# Patient Record
Sex: FEMALE | URBAN - METROPOLITAN AREA
[De-identification: names, ages, dates, MRNs, and addresses within clinical notes are randomized per-mention and may not be internally consistent; named-entity substitution may affect disease eponyms.]

---

## 2023-03-07 LAB
ANION GAP IN SER/PLAS: 14 MMOL/L (ref 10–20)
CALCIUM (MG/DL) IN SER/PLAS: 8.4 MG/DL (ref 8.6–10.3)
CARBON DIOXIDE, TOTAL (MMOL/L) IN SER/PLAS: 22 MMOL/L (ref 21–32)
CHLORIDE (MMOL/L) IN SER/PLAS: 106 MMOL/L (ref 98–107)
CREATININE (MG/DL) IN SER/PLAS: 1.23 MG/DL (ref 0.5–1.05)
ERYTHROCYTE DISTRIBUTION WIDTH (RATIO) BY AUTOMATED COUNT: 15.1 % (ref 11.5–14.5)
ERYTHROCYTE MEAN CORPUSCULAR HEMOGLOBIN CONCENTRATION (G/DL) BY AUTOMATED: 32 G/DL (ref 32–36)
ERYTHROCYTE MEAN CORPUSCULAR VOLUME (FL) BY AUTOMATED COUNT: 92 FL (ref 80–100)
ERYTHROCYTES (10*6/UL) IN BLOOD BY AUTOMATED COUNT: 5.06 X10E12/L (ref 4–5.2)
GFR FEMALE: 44 ML/MIN/1.73M2
GLUCOSE (MG/DL) IN SER/PLAS: 84 MG/DL (ref 74–99)
HEMATOCRIT (%) IN BLOOD BY AUTOMATED COUNT: 46.6 % (ref 36–46)
HEMOGLOBIN (G/DL) IN BLOOD: 14.9 G/DL (ref 12–16)
LEUKOCYTES (10*3/UL) IN BLOOD BY AUTOMATED COUNT: 13.7 X10E9/L (ref 4.4–11.3)
NRBC (PER 100 WBCS) BY AUTOMATED COUNT: 0 /100 WBC (ref 0–0)
PLATELETS (10*3/UL) IN BLOOD AUTOMATED COUNT: 290 X10E9/L (ref 150–450)
POTASSIUM (MMOL/L) IN SER/PLAS: 4.3 MMOL/L (ref 3.5–5.3)
SODIUM (MMOL/L) IN SER/PLAS: 138 MMOL/L (ref 136–145)
UREA NITROGEN (MG/DL) IN SER/PLAS: 27 MG/DL (ref 6–23)

## 2023-06-23 ENCOUNTER — NURSING HOME VISIT (OUTPATIENT)
Dept: POST ACUTE CARE | Facility: EXTERNAL LOCATION | Age: 83
End: 2023-06-23
Payer: COMMERCIAL

## 2023-06-23 DIAGNOSIS — I63.9 CEREBROVASCULAR ACCIDENT (CVA), UNSPECIFIED MECHANISM (MULTI): ICD-10-CM

## 2023-06-23 DIAGNOSIS — I10 PRIMARY HYPERTENSION: ICD-10-CM

## 2023-06-23 DIAGNOSIS — I25.10 CORONARY ARTERY DISEASE INVOLVING NATIVE CORONARY ARTERY OF NATIVE HEART WITHOUT ANGINA PECTORIS: Primary | ICD-10-CM

## 2023-06-23 DIAGNOSIS — I48.0 PAROXYSMAL ATRIAL FIBRILLATION (MULTI): ICD-10-CM

## 2023-06-23 PROCEDURE — 99305 1ST NF CARE MODERATE MDM 35: CPT | Performed by: INTERNAL MEDICINE

## 2023-06-23 NOTE — LETTER
Patient: Erinn Eagle  : 1940    Encounter Date: 2023    HISTORY & PHYSICAL    Subjective  Chief complaint: Erinn Eagle is a 83 y.o. female who is a acute skilled care patient being seen and evaluated for multiple medical problems.  Patient presents for weakness.    HPI:  Patient was admitted to the hospital for back pain. Patient was started on vanco and Zosyn for HTN. Patient's back pain was evaluated by PT and OT. Pt was stable and discharged to a SNF for back pain.         Past Medical History:   Diagnosis Date   • A-fib (CMS/[facility])    • CAD (coronary artery disease)    • Chronic back pain    • CVA (cerebral vascular accident) (CMS/[facility])    • Hypertension, essential        No past surgical history on file.    Family History   Problem Relation Name Age of Onset   • No Known Problems Mother     • No Known Problems Father         Social History     Socioeconomic History   • Marital status: Not on file     Spouse name: Not on file   • Number of children: Not on file   • Years of education: Not on file   • Highest education level: Not on file   Occupational History   • Not on file   Tobacco Use   • Smoking status: Not on file   • Smokeless tobacco: Not on file   Substance and Sexual Activity   • Alcohol use: Not on file   • Drug use: Not on file   • Sexual activity: Not on file   Other Topics Concern   • Not on file   Social History Narrative   • Not on file     Social Determinants of Health     Financial Resource Strain: Not on file   Food Insecurity: Not on file   Transportation Needs: Not on file   Physical Activity: Not on file   Stress: Not on file   Social Connections: Not on file   Intimate Partner Violence: Not on file   Housing Stability: Not on file       Vital signs: 128/68, 97.9, 86, 18, 99%    Objective  Physical Exam  HENT:      Head: Normocephalic and atraumatic.      Nose: Nose normal.      Mouth/Throat:      Mouth: Mucous membranes are moist.      Pharynx: Oropharynx is clear.    Eyes:      Extraocular Movements: Extraocular movements intact.      Pupils: Pupils are equal, round, and reactive to light.   Cardiovascular:      Rate and Rhythm: Normal rate and regular rhythm.   Pulmonary:      Effort: No respiratory distress.      Breath sounds: Normal breath sounds. No wheezing, rhonchi or rales.   Abdominal:      General: Bowel sounds are normal. There is no distension.      Palpations: Abdomen is soft.      Tenderness: There is no abdominal tenderness. There is no guarding.   Musculoskeletal:      Right lower leg: No edema.      Left lower leg: No edema.   Skin:     General: Skin is warm and dry.   Neurological:      Mental Status: She is alert. Mental status is at baseline.         Assessment/Plan  Problem List Items Addressed This Visit       Paroxysmal atrial fibrillation (CMS/HCC)    Coronary artery disease involving native coronary artery - Primary    CVA (cerebral vascular accident) (CMS/[facility])    Primary hypertension     Medications, treatments, and labs reviewed  Continue medications and treatments as listed in [facility]    Scribe Attestation  Opal WEISS Scribe   attest that this documentation has been prepared under the direction and in the presence of Brandan Yan DO.    An interactive audio and/or video telecommunication system which permits real time communications between the patient (at the originating site) and provider (at a distant site) was utilized to provide this telehealth service after obtaining verbal consent.    Provider Attestation - Scribe documentation  All medical record entries made by the Scribe were at my direction and personally dictated by me. I have reviewed the chart and agree that the record accurately reflects my personal performance of the history, physical exam, discussion and plan.    Brandan Yan DO          Electronically Signed By: Brandan Yan DO   6/29/23  9:48 PM

## 2023-06-26 ENCOUNTER — NURSING HOME VISIT (OUTPATIENT)
Dept: POST ACUTE CARE | Facility: EXTERNAL LOCATION | Age: 83
End: 2023-06-26
Payer: COMMERCIAL

## 2023-06-26 DIAGNOSIS — I48.0 PAROXYSMAL ATRIAL FIBRILLATION (MULTI): ICD-10-CM

## 2023-06-26 DIAGNOSIS — I63.9 CEREBROVASCULAR ACCIDENT (CVA), UNSPECIFIED MECHANISM (MULTI): ICD-10-CM

## 2023-06-26 DIAGNOSIS — F03.90 DEMENTIA, UNSPECIFIED DEMENTIA SEVERITY, UNSPECIFIED DEMENTIA TYPE, UNSPECIFIED WHETHER BEHAVIORAL, PSYCHOTIC, OR MOOD DISTURBANCE OR ANXIETY (MULTI): ICD-10-CM

## 2023-06-26 DIAGNOSIS — I25.10 CORONARY ARTERY DISEASE INVOLVING NATIVE CORONARY ARTERY OF NATIVE HEART WITHOUT ANGINA PECTORIS: Primary | ICD-10-CM

## 2023-06-26 DIAGNOSIS — K21.9 GASTROESOPHAGEAL REFLUX DISEASE WITHOUT ESOPHAGITIS: ICD-10-CM

## 2023-06-26 DIAGNOSIS — I10 PRIMARY HYPERTENSION: ICD-10-CM

## 2023-06-26 PROCEDURE — 99309 SBSQ NF CARE MODERATE MDM 30: CPT | Performed by: INTERNAL MEDICINE

## 2023-06-26 NOTE — LETTER
Patient: Erinn Eagle  : 1940    Encounter Date: 2023    PROGRESS NOTE    Subjective  Chief complaint: Erinn Eagle is a 83 y.o. female who is a acute skilled care patient being seen and evaluated for monthly general medical care and follow-up.    HPI:  HPI   Patient presents for general medical care and f/u.  Patient seen and examined at bedside.  Patient has been working in therapy due to weakness after recent hospitalization and back pain.  Patient has no acute complaints.  Denies n/v/f/c and chest pain.  No new concerns reported by nursing.    Objective  Vital signs: 128/68, 99% O2 via nc  Physical Exam  Constitutional:       General: She is not in acute distress.  Eyes:      Extraocular Movements: Extraocular movements intact.   Pulmonary:      Effort: Pulmonary effort is normal.   Genitourinary:     Comments: Snell  Musculoskeletal:      Cervical back: Neck supple.   Neurological:      Mental Status: She is alert.   Psychiatric:         Mood and Affect: Mood normal.         Behavior: Behavior is cooperative.         Assessment/Plan  Problem List Items Addressed This Visit       Paroxysmal atrial fibrillation (CMS/HCC)    Coronary artery disease involving native coronary artery - Primary    CVA (cerebral vascular accident) (CMS/HCC)    Primary hypertension    GERD (gastroesophageal reflux disease)    Dementia (CMS/HCC)     Medications, treatments, and labs reviewed  Continue medications and treatments as listed in EMR    Scribe Attestation  I, Tessy Albarado   attest that this documentation has been prepared under the direction and in the presence of Brandan Yan DO    Provider Attestation - Scribe documentation  All medical record entries made by the Scribe were at my direction and personally dictated by me. I have reviewed the chart and agree that the record accurately reflects my personal performance of the history, physical exam, discussion and plan.   Brandan CHACON  DO Yuriy        Electronically Signed By: Brandan Yan DO   9/27/23 11:06 PM

## 2023-06-26 NOTE — PROGRESS NOTES
HISTORY & PHYSICAL    Subjective   Chief complaint: Erinn Eagle is a 83 y.o. female who is a acute skilled care patient being seen and evaluated for multiple medical problems.  Patient presents for weakness.    HPI:  Patient was admitted to the hospital for back pain. Patient was started on vanco and Zosyn for HTN. Patient's back pain was evaluated by PT and OT. Pt was stable and discharged to a SNF for back pain.         Past Medical History:   Diagnosis Date    A-fib (CMS/[facility])     CAD (coronary artery disease)     Chronic back pain     CVA (cerebral vascular accident) (CMS/[facility])     Hypertension, essential        No past surgical history on file.    Family History   Problem Relation Name Age of Onset    No Known Problems Mother      No Known Problems Father         Social History     Socioeconomic History    Marital status: Not on file     Spouse name: Not on file    Number of children: Not on file    Years of education: Not on file    Highest education level: Not on file   Occupational History    Not on file   Tobacco Use    Smoking status: Not on file    Smokeless tobacco: Not on file   Substance and Sexual Activity    Alcohol use: Not on file    Drug use: Not on file    Sexual activity: Not on file   Other Topics Concern    Not on file   Social History Narrative    Not on file     Social Determinants of Health     Financial Resource Strain: Not on file   Food Insecurity: Not on file   Transportation Needs: Not on file   Physical Activity: Not on file   Stress: Not on file   Social Connections: Not on file   Intimate Partner Violence: Not on file   Housing Stability: Not on file       Vital signs: 128/68, 97.9, 86, 18, 99%    Objective   Physical Exam  HENT:      Head: Normocephalic and atraumatic.      Nose: Nose normal.      Mouth/Throat:      Mouth: Mucous membranes are moist.      Pharynx: Oropharynx is clear.   Eyes:      Extraocular Movements: Extraocular movements intact.      Pupils: Pupils are  equal, round, and reactive to light.   Cardiovascular:      Rate and Rhythm: Normal rate and regular rhythm.   Pulmonary:      Effort: No respiratory distress.      Breath sounds: Normal breath sounds. No wheezing, rhonchi or rales.   Abdominal:      General: Bowel sounds are normal. There is no distension.      Palpations: Abdomen is soft.      Tenderness: There is no abdominal tenderness. There is no guarding.   Musculoskeletal:      Right lower leg: No edema.      Left lower leg: No edema.   Skin:     General: Skin is warm and dry.   Neurological:      Mental Status: She is alert. Mental status is at baseline.         Assessment/Plan   Problem List Items Addressed This Visit       Paroxysmal atrial fibrillation (CMS/[facility])    Coronary artery disease involving native coronary artery - Primary    CVA (cerebral vascular accident) (CMS/[facility])    Primary hypertension     Medications, treatments, and labs reviewed  Continue medications and treatments as listed in [facility]    Scribe Attestation  I, Tessy Mandel   attest that this documentation has been prepared under the direction and in the presence of Brandan Yan DO.    An interactive audio and/or video telecommunication system which permits real time communications between the patient (at the originating site) and provider (at a distant site) was utilized to provide this telehealth service after obtaining verbal consent.    Provider Attestation - Scribe documentation  All medical record entries made by the Scribe were at my direction and personally dictated by me. I have reviewed the chart and agree that the record accurately reflects my personal performance of the history, physical exam, discussion and plan.    Brandan Yan DO

## 2023-06-29 PROBLEM — I48.0 PAROXYSMAL ATRIAL FIBRILLATION (MULTI): Status: ACTIVE | Noted: 2023-06-29

## 2023-06-29 PROBLEM — I10 PRIMARY HYPERTENSION: Status: ACTIVE | Noted: 2023-06-29

## 2023-06-29 PROBLEM — I25.10 CORONARY ARTERY DISEASE INVOLVING NATIVE CORONARY ARTERY: Status: ACTIVE | Noted: 2023-06-29

## 2023-06-29 PROBLEM — I63.9 CVA (CEREBRAL VASCULAR ACCIDENT) (MULTI): Status: ACTIVE | Noted: 2023-06-29

## 2023-07-06 ENCOUNTER — NURSING HOME VISIT (OUTPATIENT)
Dept: POST ACUTE CARE | Facility: EXTERNAL LOCATION | Age: 83
End: 2023-07-06
Payer: COMMERCIAL

## 2023-07-06 DIAGNOSIS — F03.90 DEMENTIA, UNSPECIFIED DEMENTIA SEVERITY, UNSPECIFIED DEMENTIA TYPE, UNSPECIFIED WHETHER BEHAVIORAL, PSYCHOTIC, OR MOOD DISTURBANCE OR ANXIETY (MULTI): ICD-10-CM

## 2023-07-06 DIAGNOSIS — I48.0 PAROXYSMAL ATRIAL FIBRILLATION (MULTI): ICD-10-CM

## 2023-07-06 DIAGNOSIS — Z99.2 STAGE 5 CHRONIC KIDNEY DISEASE ON CHRONIC DIALYSIS (MULTI): ICD-10-CM

## 2023-07-06 DIAGNOSIS — K21.9 GASTROESOPHAGEAL REFLUX DISEASE WITHOUT ESOPHAGITIS: ICD-10-CM

## 2023-07-06 DIAGNOSIS — I10 PRIMARY HYPERTENSION: ICD-10-CM

## 2023-07-06 DIAGNOSIS — N18.6 STAGE 5 CHRONIC KIDNEY DISEASE ON CHRONIC DIALYSIS (MULTI): ICD-10-CM

## 2023-07-06 DIAGNOSIS — R23.9 IMPAIRED SKIN INTEGRITY: Primary | ICD-10-CM

## 2023-07-06 PROCEDURE — 99309 SBSQ NF CARE MODERATE MDM 30: CPT

## 2023-07-06 NOTE — LETTER
Patient: Erinn Eagle  : 1940    Encounter Date: 2023    PROGRESS NOTE    Subjective  Chief complaint: Erinn Eagle is a 83 y.o. female who is an acute skilled patient being seen and evaluated for weakness    HPI:  Patient was admitted to the hospital for back pain. Patient was started on vanco and Zosyn for HTN. Patient's back pain was evaluated by PT and OT. Pt was stable and discharged to a SNF for back pain.     23 Patient offers no complaints at time. LBP controlled w/ oxycodone.  Afib stable. Denies hot/cold intolerance, diarrhea/constipation. Denies f/c/n/v/d.  HD per schedule.Cont' w/ therapy for weakness, gait and strengthening. No concerns per nursing.      Objective  Vital signs: 166/87-97.9-86-18-96%    Physical Exam  Constitutional:       Appearance: Normal appearance.   HENT:      Head: Normocephalic.      Mouth/Throat:      Mouth: Mucous membranes are moist.   Eyes:      Extraocular Movements: Extraocular movements intact.   Cardiovascular:      Rate and Rhythm: Normal rate. Rhythm irregular.      Pulses: Normal pulses.      Heart sounds: Normal heart sounds.   Pulmonary:      Effort: Pulmonary effort is normal.      Breath sounds: Normal breath sounds.      Comments: O2 2LNC  Abdominal:      General: Bowel sounds are normal.      Palpations: Abdomen is soft.   Genitourinary:     Comments: Urinary retention- singer- CD CYU  Musculoskeletal:         General: Normal range of motion.      Cervical back: Normal range of motion and neck supple.      Comments: Gen weakness  BLE wounds   Skin:     General: Skin is warm and dry.      Capillary Refill: Capillary refill takes 2 to 3 seconds.      Comments: Wounds BLE  elephantitis-like skin BLE   Neurological:      Mental Status: She is alert. Mental status is at baseline.   Psychiatric:         Mood and Affect: Mood normal.         Behavior: Behavior normal.         Assessment/Plan  Problem List Items Addressed This Visit        Paroxysmal atrial fibrillation (CMS/[facility])     Stable  Rate controlled  No palp sob fatigue dizziness  Eliquis  Bleeding precautions         Primary hypertension     BP elevated at time of assessment- no am meds received  HD as scheduled  Lasix aldactone  Monitor  Routine labs         Impaired skin integrity - Primary     BLE interrupted skin integrity  DSD, elevation  Wound care to monitor  monitor         Renal failure     Stable  HD scheduled         GERD (gastroesophageal reflux disease)     Controlled  Famatodine  monitor         Dementia (CMS/[facility])     Stable  Baseline mentation  No outbursts, agitation, aggressive behaviors  Monitor            Medications, treatments, and labs reviewed  Continue medications and treatments as listed in EMR    BROOKS Roper      Electronically Signed By: BROOKS Roper   7/8/23  2:43 PM

## 2023-07-07 ENCOUNTER — NURSING HOME VISIT (OUTPATIENT)
Dept: POST ACUTE CARE | Facility: EXTERNAL LOCATION | Age: 83
End: 2023-07-07
Payer: COMMERCIAL

## 2023-07-07 DIAGNOSIS — N18.6 STAGE 5 CHRONIC KIDNEY DISEASE ON CHRONIC DIALYSIS (MULTI): ICD-10-CM

## 2023-07-07 DIAGNOSIS — F03.90 DEMENTIA, UNSPECIFIED DEMENTIA SEVERITY, UNSPECIFIED DEMENTIA TYPE, UNSPECIFIED WHETHER BEHAVIORAL, PSYCHOTIC, OR MOOD DISTURBANCE OR ANXIETY (MULTI): ICD-10-CM

## 2023-07-07 DIAGNOSIS — K21.9 GASTROESOPHAGEAL REFLUX DISEASE WITHOUT ESOPHAGITIS: ICD-10-CM

## 2023-07-07 DIAGNOSIS — R53.1 WEAKNESS: Primary | ICD-10-CM

## 2023-07-07 DIAGNOSIS — Z99.2 STAGE 5 CHRONIC KIDNEY DISEASE ON CHRONIC DIALYSIS (MULTI): ICD-10-CM

## 2023-07-07 PROCEDURE — 99308 SBSQ NF CARE LOW MDM 20: CPT | Performed by: REGISTERED NURSE

## 2023-07-07 NOTE — LETTER
Patient: Erinn Eagle  : 1940    Encounter Date: 2023    PROGRESS NOTE    Subjective  Chief complaint: Erinn Eagle is a 83 y.o. female who is a acute skilled care patient being seen and evaluated for weakness.    HPI:  23 Patient was admitted to the hospital for back pain. Patient was started on vanco and Zosyn for HTN. Patient's back pain was evaluated by PT and OT. Pt was stable and discharged to a SNF for back pain.     23 Patient offers no complaints at time. LBP controlled w/ oxycodone.  Afib stable. Denies hot/cold intolerance, diarrhea/constipation. Denies f/c/n/v/d.  HD per schedule.Cont' w/ therapy for weakness, gait and strengthening. No concerns per nursing.    23   Therapy working with the patient on various BLE therapy exercises to improve strength and functional mobility.  Also worked on skilled interventions focused on weight shifting.  Completed 3 sets of 10 reps.  Patient is stable.  No new concerns reported by nursing.      Objective  Vital signs:   18, 110/67, 97.3, 81, 96%  Physical Exam  Constitutional:       General: She is not in acute distress.  Eyes:      Extraocular Movements: Extraocular movements intact.   Pulmonary:      Effort: Pulmonary effort is normal.   Musculoskeletal:      Cervical back: Neck supple.   Neurological:      Mental Status: She is alert.   Psychiatric:         Mood and Affect: Mood normal.         Behavior: Behavior is cooperative.         Assessment/Plan  Problem List Items Addressed This Visit       Renal failure     Continue dialysis         GERD (gastroesophageal reflux disease)     Stable monitor         Dementia (CMS/[facility])     Stable no agitation monitor for behaviors         Weakness - Primary     Continue PT OT          Medications, treatments, and labs reviewed  Continue medications and treatments as listed in EMR    Scribe Attestation  I, Crystal Martinez , Tessy   attest that this documentation has been prepared under the  direction and in the presence of BROOKS Henderson    Provider Attestation - Scribe documentation  All medical record entries made by the Scribe were at my direction and personally dictated by me. I have reviewed the chart and agree that the record accurately reflects my personal performance of the history, physical exam, discussion and plan.   BROOKS Henderson        Electronically Signed By: BROOKS Henderson   8/17/23 12:54 PM

## 2023-07-08 PROBLEM — N19 RENAL FAILURE: Status: ACTIVE | Noted: 2023-07-08

## 2023-07-08 PROBLEM — R23.9 IMPAIRED SKIN INTEGRITY: Status: ACTIVE | Noted: 2023-07-08

## 2023-07-08 PROBLEM — K21.9 GERD (GASTROESOPHAGEAL REFLUX DISEASE): Status: ACTIVE | Noted: 2023-07-08

## 2023-07-08 PROBLEM — F03.90 DEMENTIA (MULTI): Status: ACTIVE | Noted: 2023-07-08

## 2023-07-08 NOTE — PROGRESS NOTES
PROGRESS NOTE    Subjective   Chief complaint: Erinn Eagle is a 83 y.o. female who is an acute skilled patient being seen and evaluated for weakness    HPI:  Patient was admitted to the hospital for back pain. Patient was started on vanco and Zosyn for HTN. Patient's back pain was evaluated by PT and OT. Pt was stable and discharged to a SNF for back pain.     7/6/23 Patient offers no complaints at time. LBP controlled w/ oxycodone.  Afib stable. Denies hot/cold intolerance, diarrhea/constipation. Denies f/c/n/v/d.  HD per schedule.Cont' w/ therapy for weakness, gait and strengthening. No concerns per nursing.      Objective   Vital signs: 166/87-97.9-86-18-96%    Physical Exam  Constitutional:       Appearance: Normal appearance.   HENT:      Head: Normocephalic.      Mouth/Throat:      Mouth: Mucous membranes are moist.   Eyes:      Extraocular Movements: Extraocular movements intact.   Cardiovascular:      Rate and Rhythm: Normal rate. Rhythm irregular.      Pulses: Normal pulses.      Heart sounds: Normal heart sounds.   Pulmonary:      Effort: Pulmonary effort is normal.      Breath sounds: Normal breath sounds.      Comments: O2 2LNC  Abdominal:      General: Bowel sounds are normal.      Palpations: Abdomen is soft.   Genitourinary:     Comments: Urinary retention- singer- CD CYU  Musculoskeletal:         General: Normal range of motion.      Cervical back: Normal range of motion and neck supple.      Comments: Gen weakness  BLE wounds   Skin:     General: Skin is warm and dry.      Capillary Refill: Capillary refill takes 2 to 3 seconds.      Comments: Wounds BLE  elephantitis-like skin BLE   Neurological:      Mental Status: She is alert. Mental status is at baseline.   Psychiatric:         Mood and Affect: Mood normal.         Behavior: Behavior normal.         Assessment/Plan   Problem List Items Addressed This Visit       Paroxysmal atrial fibrillation (CMS/HCC)     Stable  Rate controlled  No palp  sob fatigue dizziness  Eliquis  Bleeding precautions         Primary hypertension     BP elevated at time of assessment- no am meds received  HD as scheduled  Lasix aldactone  Monitor  Routine labs         Impaired skin integrity - Primary     BLE interrupted skin integrity  DSD, elevation  Wound care to monitor  monitor         Renal failure     Stable  HD scheduled         GERD (gastroesophageal reflux disease)     Controlled  Famatodine  monitor         Dementia (CMS/[facility])     Stable  Baseline mentation  No outbursts, agitation, aggressive behaviors  Monitor            Medications, treatments, and labs reviewed  Continue medications and treatments as listed in EMR    Rika Kitchen, APRN-CNP

## 2023-07-08 NOTE — ASSESSMENT & PLAN NOTE
BP elevated at time of assessment- no am meds received  HD as scheduled  Lasix aldactone  Monitor  Routine labs

## 2023-07-10 ENCOUNTER — NURSING HOME VISIT (OUTPATIENT)
Dept: POST ACUTE CARE | Facility: EXTERNAL LOCATION | Age: 83
End: 2023-07-10
Payer: COMMERCIAL

## 2023-07-10 DIAGNOSIS — K21.9 GASTROESOPHAGEAL REFLUX DISEASE WITHOUT ESOPHAGITIS: ICD-10-CM

## 2023-07-10 DIAGNOSIS — I63.9 CEREBROVASCULAR ACCIDENT (CVA), UNSPECIFIED MECHANISM (MULTI): ICD-10-CM

## 2023-07-10 DIAGNOSIS — R53.1 WEAKNESS: ICD-10-CM

## 2023-07-10 DIAGNOSIS — F03.90 DEMENTIA, UNSPECIFIED DEMENTIA SEVERITY, UNSPECIFIED DEMENTIA TYPE, UNSPECIFIED WHETHER BEHAVIORAL, PSYCHOTIC, OR MOOD DISTURBANCE OR ANXIETY (MULTI): ICD-10-CM

## 2023-07-10 DIAGNOSIS — N18.6 STAGE 5 CHRONIC KIDNEY DISEASE ON CHRONIC DIALYSIS (MULTI): ICD-10-CM

## 2023-07-10 DIAGNOSIS — I10 PRIMARY HYPERTENSION: ICD-10-CM

## 2023-07-10 DIAGNOSIS — I48.0 PAROXYSMAL ATRIAL FIBRILLATION (MULTI): ICD-10-CM

## 2023-07-10 DIAGNOSIS — Z99.2 STAGE 5 CHRONIC KIDNEY DISEASE ON CHRONIC DIALYSIS (MULTI): ICD-10-CM

## 2023-07-10 DIAGNOSIS — I25.10 CORONARY ARTERY DISEASE INVOLVING NATIVE CORONARY ARTERY OF NATIVE HEART WITHOUT ANGINA PECTORIS: Primary | ICD-10-CM

## 2023-07-10 PROCEDURE — 99308 SBSQ NF CARE LOW MDM 20: CPT | Performed by: INTERNAL MEDICINE

## 2023-07-10 NOTE — LETTER
Patient: Erinn Eagle  : 1940    Encounter Date: 07/10/2023    PROGRESS NOTE    Subjective  Chief complaint: Erinn Eagle is a 83 y.o. female who is a acute skilled care patient being seen and evaluated for weakness.    HPI:  23 Patient was admitted to the hospital for back pain. Patient was started on vanco and Zosyn for HTN. Patient's back pain was evaluated by PT and OT. Pt was stable and discharged to a SNF for back pain.     23 Patient offers no complaints at time. LBP controlled w/ oxycodone.  Afib stable. Denies hot/cold intolerance, diarrhea/constipation. Denies f/c/n/v/d.  HD per schedule.Cont' w/ therapy for weakness, gait and strengthening. No concerns per nursing.    23   Therapy working with the patient on various BLE therapy exercises to improve strength and functional mobility.  Also worked on skilled interventions focused on weight shifting.  Completed 3 sets of 10 reps.  Patient is stable.  No new concerns reported by nursing.    7/10/23  Patient skilled for therapy due to weakness is working in therapy to increase strength and improve functional mobility.  Requires Min/CGA for bed mobility and Mod A for sit to stand transfers.  Patient is stable.  Denies n/v/f/c.  No new concerns reported by nursing. Patient in right knee immobilizer.      Objective  Vital signs: 118/69  Physical Exam  Constitutional:       General: She is not in acute distress.  Eyes:      Extraocular Movements: Extraocular movements intact.   Pulmonary:      Effort: Pulmonary effort is normal.   Musculoskeletal:      Cervical back: Neck supple.      Comments: Generalized weakness  Right knee immobilizer   Neurological:      Mental Status: She is alert.   Psychiatric:         Mood and Affect: Mood normal.         Behavior: Behavior is cooperative.         Assessment/Plan  Problem List Items Addressed This Visit       Paroxysmal atrial fibrillation (CMS/HCC)    Coronary artery disease involving native  coronary artery - Primary    CVA (cerebral vascular accident) (CMS/[facility])    Primary hypertension    Renal failure    GERD (gastroesophageal reflux disease)    Dementia (CMS/[facility])    Weakness     Medications, treatments, and labs reviewed  Continue medications and treatments as listed in EMR    Scribe Attestation  ICrystal Scribe   attest that this documentation has been prepared under the direction and in the presence of Brandan Yan DO    Provider Attestation - Scribe documentation  All medical record entries made by the Scribe were at my direction and personally dictated by me. I have reviewed the chart and agree that the record accurately reflects my personal performance of the history, physical exam, discussion and plan.   Brandan Yan DO        Electronically Signed By: Brandan Yan DO   8/30/23 10:27 PM

## 2023-07-12 ENCOUNTER — NURSING HOME VISIT (OUTPATIENT)
Dept: POST ACUTE CARE | Facility: EXTERNAL LOCATION | Age: 83
End: 2023-07-12
Payer: COMMERCIAL

## 2023-07-12 DIAGNOSIS — Z99.2 STAGE 5 CHRONIC KIDNEY DISEASE ON CHRONIC DIALYSIS (MULTI): ICD-10-CM

## 2023-07-12 DIAGNOSIS — F03.90 DEMENTIA, UNSPECIFIED DEMENTIA SEVERITY, UNSPECIFIED DEMENTIA TYPE, UNSPECIFIED WHETHER BEHAVIORAL, PSYCHOTIC, OR MOOD DISTURBANCE OR ANXIETY (MULTI): ICD-10-CM

## 2023-07-12 DIAGNOSIS — N18.6 STAGE 5 CHRONIC KIDNEY DISEASE ON CHRONIC DIALYSIS (MULTI): ICD-10-CM

## 2023-07-12 DIAGNOSIS — R53.1 WEAKNESS: Primary | ICD-10-CM

## 2023-07-12 PROCEDURE — 99308 SBSQ NF CARE LOW MDM 20: CPT | Performed by: REGISTERED NURSE

## 2023-07-12 NOTE — LETTER
Patient: Erinn Eagle  : 1940    Encounter Date: 2023    PROGRESS NOTE  Subjective  Chief complaint: Erinn Eagle is a 83 y.o. female who is a acute skilled care patient being seen and evaluated for weakness    HPI:  HPI patient continues to participate in therapy denies complaints at this time  Objective  Vital signs: 110/67  Physical Exam  Constitutional:       General: She is not in acute distress.  Eyes:      Extraocular Movements: Extraocular movements intact.   Pulmonary:      Effort: Pulmonary effort is normal.   Musculoskeletal:      Cervical back: Neck supple.   Neurological:      Mental Status: She is alert.   Psychiatric:         Mood and Affect: Mood normal.         Behavior: Behavior is cooperative.         Assessment/Plan  Problem List Items Addressed This Visit       Renal failure     Continue dialysis         Dementia (CMS/HCC)     Stable no agitation monitor for behaviors         Weakness - Primary     Continue PT OT          Medications, treatments, and labs reviewed  Continue medications and treatments as listed in EMR    BROOKS Henderson      Electronically Signed By: BROOKS Henderson   23 10:52 AM

## 2023-07-13 ENCOUNTER — NURSING HOME VISIT (OUTPATIENT)
Dept: POST ACUTE CARE | Facility: EXTERNAL LOCATION | Age: 83
End: 2023-07-13
Payer: COMMERCIAL

## 2023-07-13 DIAGNOSIS — F03.90 DEMENTIA, UNSPECIFIED DEMENTIA SEVERITY, UNSPECIFIED DEMENTIA TYPE, UNSPECIFIED WHETHER BEHAVIORAL, PSYCHOTIC, OR MOOD DISTURBANCE OR ANXIETY (MULTI): Primary | ICD-10-CM

## 2023-07-13 DIAGNOSIS — R23.9 IMPAIRED SKIN INTEGRITY: ICD-10-CM

## 2023-07-13 DIAGNOSIS — I48.0 PAROXYSMAL ATRIAL FIBRILLATION (MULTI): ICD-10-CM

## 2023-07-13 PROCEDURE — 99309 SBSQ NF CARE MODERATE MDM 30: CPT

## 2023-07-13 NOTE — LETTER
Patient: Erinn Eagle  : 1940    Encounter Date: 2023     he is at the game with Nannette.  He had your come over to let Ashley out.PROGRESS NOTE    Subjective  Chief complaint: Erinn Eagle is a 83 y.o. female who is an acute skilled patient being seen and evaluated for weakness    HPI:  Patient was admitted to the hospital for back pain. Patient was started on vanco and Zosyn for HTN. Patient's back pain was evaluated by PT and OT. Pt was stable and discharged to a SNF for back pain.     23 Patient offers no complaints at time. LBP controlled w/ oxycodone.  Afib stable. Denies hot/cold intolerance, diarrhea/constipation. Denies f/c/n/v/d.  HD per schedule.Cont' w/ therapy for weakness, gait and strengthening. No concerns per nursing.    23   Patient at baseline mentation.  Offers no complaints of SOB, cough, chest pain, F/C/N/V/D.   LBP controlled.  Continues with PT for strengthening.  BLE ADRIANE, followed by wound care.  No concerns per nursing      Objective  Vital signs: 129/70-97.7-79-20-97!    Physical Exam  HENT:      Head: Normocephalic.      Mouth/Throat:      Mouth: Mucous membranes are moist.   Eyes:      Extraocular Movements: Extraocular movements intact.   Cardiovascular:      Rate and Rhythm: Normal rate. Rhythm irregular.      Pulses: Normal pulses.      Heart sounds: Normal heart sounds.   Pulmonary:      Effort: Pulmonary effort is normal.      Breath sounds: Normal breath sounds.      Comments:   O2 22L NC  Abdominal:      General: Bowel sounds are normal.      Palpations: Abdomen is soft.   Genitourinary:     Comments:  indwelling Snell  Musculoskeletal:         General: Normal range of motion.      Cervical back: Normal range of motion and neck supple.      Comments:  BLE weakness   BLE skin rippled and scaly (elephantitis-like tissue)   Skin:     General: Skin is warm and dry.      Capillary Refill: Capillary refill takes 2 to 3 seconds.   Neurological:      Mental  Status: She is alert. Mental status is at baseline.   Psychiatric:         Mood and Affect: Mood normal.         Behavior: Behavior normal.         Assessment/Plan  Problem List Items Addressed This Visit       Paroxysmal atrial fibrillation (CMS/[facility])      Stable   rate controlled   no SOB, palpitations, dizziness   Eliquis   bleeding precautions         Impaired skin integrity      BLE ADRIANE   BLE skin scaly, elephantitis like tissue   wounds ADRIANE   wound care to follow         Dementia (CMS/[facility]) - Primary      Stable   baseline mentation   no agitation per nursing   monitor          Medications, treatments, and labs reviewed  Continue medications and treatments as listed in EMR    BROOKS Roper      Electronically Signed By: BROOKS Roper   7/16/23  8:17 PM

## 2023-07-14 ENCOUNTER — NURSING HOME VISIT (OUTPATIENT)
Dept: POST ACUTE CARE | Facility: EXTERNAL LOCATION | Age: 83
End: 2023-07-14
Payer: COMMERCIAL

## 2023-07-14 DIAGNOSIS — R53.1 WEAKNESS: Primary | ICD-10-CM

## 2023-07-14 DIAGNOSIS — I10 PRIMARY HYPERTENSION: ICD-10-CM

## 2023-07-14 DIAGNOSIS — F03.90 DEMENTIA, UNSPECIFIED DEMENTIA SEVERITY, UNSPECIFIED DEMENTIA TYPE, UNSPECIFIED WHETHER BEHAVIORAL, PSYCHOTIC, OR MOOD DISTURBANCE OR ANXIETY (MULTI): ICD-10-CM

## 2023-07-14 PROCEDURE — 99308 SBSQ NF CARE LOW MDM 20: CPT | Performed by: REGISTERED NURSE

## 2023-07-14 NOTE — LETTER
Patient: Erinn Eagle  : 1940    Encounter Date: 2023    PROGRESS NOTE    Subjective  Chief complaint: Erinn Eagle is a 83 y.o. female who is a acute skilled care patient being seen and evaluated for weakness.    HPI:  23 Patient was admitted to the hospital for back pain. Patient was started on vanco and Zosyn for HTN. Patient's back pain was evaluated by PT and OT. Pt was stable and discharged to a SNF for back pain.     23 Patient offers no complaints at time. LBP controlled w/ oxycodone.  Afib stable. Denies hot/cold intolerance, diarrhea/constipation. Denies f/c/n/v/d.  HD per schedule.Cont' w/ therapy for weakness, gait and strengthening. No concerns per nursing.    23   Therapy working with the patient on various BLE therapy exercises to improve strength and functional mobility.  Also worked on skilled interventions focused on weight shifting.  Completed 3 sets of 10 reps.  Patient is stable.  No new concerns reported by nursing.    7/10/23  Patient skilled for therapy due to weakness is working in therapy to increase strength and improve functional mobility.  Requires Min/CGA for bed mobility and Mod A for sit to stand transfers.  Patient is stable.  Denies n/v/f/c.  No new concerns reported by nursing.    23   Patient at baseline mentation.  Offers no complaints of SOB, cough, chest pain, F/C/N/V/D.   LBP controlled.  Continues with PT for strengthening.  BLE ADRIANE, followed by wound care.  No concerns per nursing    23    Patient continues to participate in therapy.  Working on bilateral lower extremity exercises.  Completed 2 sets of 15 reps.  Also working on lower extremity exercises.  Completed 3 sets of 5 reps.  In addition patient working on transfer training and bed mobility.  Requires partial to moderate assist for sit to stand.  Patient is stable without complaint.  No new concerns per nursing.        Objective  Vital signs: 113/85  Physical  Exam  Constitutional:       General: She is not in acute distress.  Eyes:      Extraocular Movements: Extraocular movements intact.   Pulmonary:      Effort: Pulmonary effort is normal.   Musculoskeletal:      Cervical back: Neck supple.   Neurological:      Mental Status: She is alert.   Psychiatric:         Mood and Affect: Mood normal.         Behavior: Behavior is cooperative.         Assessment/Plan  Problem List Items Addressed This Visit       Primary hypertension     BP at goal monitor         Dementia (CMS/HCC)     Stable no agitation monitor for behaviors         Weakness - Primary     Continue PT OT          Medications, treatments, and labs reviewed  Continue medications and treatments as listed in EMR    Scribe Attestation  ICrystal Scribe   attest that this documentation has been prepared under the direction and in the presence of RBOOKS Henderson    Provider Attestation - Scribe documentation  All medical record entries made by the Scribe were at my direction and personally dictated by me. I have reviewed the chart and agree that the record accurately reflects my personal performance of the history, physical exam, discussion and plan.   BROOKS Henderson        Electronically Signed By: BROOKS Henderson   8/28/23  8:08 PM

## 2023-07-17 NOTE — PROGRESS NOTES
he is at the game with Nannette.  He had your come over to let Ashley out.PROGRESS NOTE    Subjective   Chief complaint: Erinn Eagle is a 83 y.o. female who is an acute skilled patient being seen and evaluated for weakness    HPI:  Patient was admitted to the hospital for back pain. Patient was started on vanco and Zosyn for HTN. Patient's back pain was evaluated by PT and OT. Pt was stable and discharged to a SNF for back pain.     7/6/23 Patient offers no complaints at time. LBP controlled w/ oxycodone.  Afib stable. Denies hot/cold intolerance, diarrhea/constipation. Denies f/c/n/v/d.  HD per schedule.Cont' w/ therapy for weakness, gait and strengthening. No concerns per nursing.    7/13/23   Patient at baseline mentation.  Offers no complaints of SOB, cough, chest pain, F/C/N/V/D.   LBP controlled.  Continues with PT for strengthening.  BLE ADRIANE, followed by wound care.  No concerns per nursing      Objective   Vital signs: 129/70-97.7-79-20-97!    Physical Exam  HENT:      Head: Normocephalic.      Mouth/Throat:      Mouth: Mucous membranes are moist.   Eyes:      Extraocular Movements: Extraocular movements intact.   Cardiovascular:      Rate and Rhythm: Normal rate. Rhythm irregular.      Pulses: Normal pulses.      Heart sounds: Normal heart sounds.   Pulmonary:      Effort: Pulmonary effort is normal.      Breath sounds: Normal breath sounds.      Comments:   O2 22L NC  Abdominal:      General: Bowel sounds are normal.      Palpations: Abdomen is soft.   Genitourinary:     Comments:  indwelling Snell  Musculoskeletal:         General: Normal range of motion.      Cervical back: Normal range of motion and neck supple.      Comments:  BLE weakness   BLE skin rippled and scaly (elephantitis-like tissue)   Skin:     General: Skin is warm and dry.      Capillary Refill: Capillary refill takes 2 to 3 seconds.   Neurological:      Mental Status: She is alert. Mental status is at baseline.   Psychiatric:          Mood and Affect: Mood normal.         Behavior: Behavior normal.         Assessment/Plan   Problem List Items Addressed This Visit       Paroxysmal atrial fibrillation (CMS/[facility])      Stable   rate controlled   no SOB, palpitations, dizziness   Eliquis   bleeding precautions         Impaired skin integrity      BLE ADRIANE   BLE skin scaly, elephantitis like tissue   wounds ADRIANE   wound care to follow         Dementia (CMS/[facility]) - Primary      Stable   baseline mentation   no agitation per nursing   monitor          Medications, treatments, and labs reviewed  Continue medications and treatments as listed in EMR    Rika Kitchen, MODESTA-CNP

## 2023-07-18 ENCOUNTER — NURSING HOME VISIT (OUTPATIENT)
Dept: POST ACUTE CARE | Facility: EXTERNAL LOCATION | Age: 83
End: 2023-07-18
Payer: COMMERCIAL

## 2023-07-18 DIAGNOSIS — R53.1 WEAKNESS: Primary | ICD-10-CM

## 2023-07-18 DIAGNOSIS — F03.90 DEMENTIA, UNSPECIFIED DEMENTIA SEVERITY, UNSPECIFIED DEMENTIA TYPE, UNSPECIFIED WHETHER BEHAVIORAL, PSYCHOTIC, OR MOOD DISTURBANCE OR ANXIETY (MULTI): ICD-10-CM

## 2023-07-18 PROCEDURE — 99308 SBSQ NF CARE LOW MDM 20: CPT | Performed by: REGISTERED NURSE

## 2023-07-18 NOTE — LETTER
Patient: Erinn Eagle  : 1940    Encounter Date: 2023    PROGRESS NOTE    Subjective  Chief complaint: Erinn Eagle is a 83 y.o. female who is a acute skilled care patient being seen and evaluated for weakness.    HPI:  23 Patient was admitted to the hospital for back pain. Patient was started on vanco and Zosyn for HTN. Patient's back pain was evaluated by PT and OT. Pt was stable and discharged to a SNF for back pain.     23 Patient offers no complaints at time. LBP controlled w/ oxycodone.  Afib stable. Denies hot/cold intolerance, diarrhea/constipation. Denies f/c/n/v/d.  HD per schedule.Cont' w/ therapy for weakness, gait and strengthening. No concerns per nursing.    23   Therapy working with the patient on various BLE therapy exercises to improve strength and functional mobility.  Also worked on skilled interventions focused on weight shifting.  Completed 3 sets of 10 reps.  Patient is stable.  No new concerns reported by nursing.    7/10/23  Patient skilled for therapy due to weakness is working in therapy to increase strength and improve functional mobility.  Requires Min/CGA for bed mobility and Mod A for sit to stand transfers.  Patient is stable.  Denies n/v/f/c.  No new concerns reported by nursing.    23   Patient at baseline mentation.  Offers no complaints of SOB, cough, chest pain, F/C/N/V/D.   LBP controlled.  Continues with PT for strengthening.  BLE ADRIANE, followed by wound care.  No concerns per nursing    23    Patient continues to participate in therapy.  Working on bilateral lower extremity exercises.  Completed 2 sets of 15 reps.  Also working on lower extremity exercises.  Completed 3 sets of 5 reps.  In addition patient working on transfer training and bed mobility.  Requires partial to moderate assist for sit to stand.  Patient is stable without complaint.  No new concerns per nursing.    23    Patient has been working in therapy to improve  strength, endurance, and ADLs.  Patient continues to work toward goals.  Patient has no new concerns today.  Denies n/v/f/c pain.  No concerns reported by staff.      Objective  Vital signs: 118/81  Physical Exam  Constitutional:       General: She is not in acute distress.  Eyes:      Extraocular Movements: Extraocular movements intact.   Pulmonary:      Effort: Pulmonary effort is normal.   Musculoskeletal:      Cervical back: Neck supple.   Neurological:      Mental Status: She is alert.   Psychiatric:         Mood and Affect: Mood normal.         Behavior: Behavior is cooperative.         Assessment/Plan  Problem List Items Addressed This Visit       Dementia (CMS/[facility]) - Primary     Stable no agitation monitor for behaviors         Weakness     Continue PT OT          Medications, treatments, and labs reviewed  Continue medications and treatments as listed in EMR    Scribe Attestation  ICrystal Scribe   attest that this documentation has been prepared under the direction and in the presence of BROOKS Henderson    Provider Attestation - Scribe documentation  All medical record entries made by the Scribe were at my direction and personally dictated by me. I have reviewed the chart and agree that the record accurately reflects my personal performance of the history, physical exam, discussion and plan.   BROOKS Henderson        Electronically Signed By: BROOKS Henderson   8/31/23  2:53 PM

## 2023-07-19 ENCOUNTER — NURSING HOME VISIT (OUTPATIENT)
Dept: POST ACUTE CARE | Facility: EXTERNAL LOCATION | Age: 83
End: 2023-07-19
Payer: COMMERCIAL

## 2023-07-19 DIAGNOSIS — I10 PRIMARY HYPERTENSION: ICD-10-CM

## 2023-07-19 DIAGNOSIS — R53.1 WEAKNESS: Primary | ICD-10-CM

## 2023-07-19 DIAGNOSIS — F03.90 DEMENTIA, UNSPECIFIED DEMENTIA SEVERITY, UNSPECIFIED DEMENTIA TYPE, UNSPECIFIED WHETHER BEHAVIORAL, PSYCHOTIC, OR MOOD DISTURBANCE OR ANXIETY (MULTI): ICD-10-CM

## 2023-07-19 PROCEDURE — 99308 SBSQ NF CARE LOW MDM 20: CPT | Performed by: REGISTERED NURSE

## 2023-07-19 NOTE — LETTER
Patient: Erinn Eagle  : 1940    Encounter Date: 2023    PROGRESS NOTE    Subjective  Chief complaint: Erinn Eagle is a 83 y.o. female who is an acute skilled patient being seen and evaluated for weakness    HPI:  23 Patient was admitted to the hospital for back pain. Patient was started on vanco and Zosyn for HTN. Patient's back pain was evaluated by PT and OT. Pt was stable and discharged to a SNF for back pain.     23 Patient offers no complaints at time. LBP controlled w/ oxycodone.  Afib stable. Denies hot/cold intolerance, diarrhea/constipation. Denies f/c/n/v/d.  HD per schedule.Cont' w/ therapy for weakness, gait and strengthening. No concerns per nursing.    23   Therapy working with the patient on various BLE therapy exercises to improve strength and functional mobility.  Also worked on skilled interventions focused on weight shifting.  Completed 3 sets of 10 reps.  Patient is stable.  No new concerns reported by nursing.    7/10/23  Patient skilled for therapy due to weakness is working in therapy to increase strength and improve functional mobility.  Requires Min/CGA for bed mobility and Mod A for sit to stand transfers.  Patient is stable.  Denies n/v/f/c.  No new concerns reported by nursing.    23   Patient at baseline mentation.  Offers no complaints of SOB, cough, chest pain, F/C/N/V/D.   LBP controlled.  Continues with PT for strengthening.  BLE ADRIANE, followed by wound care.  No concerns per nursing    23    Patient continues to participate in therapy.  Working on bilateral lower extremity exercises.  Completed 2 sets of 15 reps.  Also working on lower extremity exercises.  Completed 3 sets of 5 reps.  In addition patient working on transfer training and bed mobility.  Requires partial to moderate assist for sit to stand.  Patient is stable without complaint.  No new concerns per nursing.    23    Patient has been working in therapy to improve  strength, endurance, and ADLs.  Patient continues to work toward goals.  Patient has no new concerns today.  Denies n/v/f/c pain.  No concerns reported by staff.    7/19/23 Therapy has been working with the patient to improve strength and endurance with ADLs, transfers, and mobility.  Patient continues to work toward goals.  Patient is stable and has no new complaints.  Nursing staff voices no new concerns today.      Objective  Vital signs: 114/64,82,98% O2 via NC     Physical Exam  Constitutional:       General: She is not in acute distress.  Eyes:      Extraocular Movements: Extraocular movements intact.   Pulmonary:      Effort: Pulmonary effort is normal.   Musculoskeletal:      Cervical back: Neck supple.   Neurological:      Mental Status: She is alert.   Psychiatric:         Mood and Affect: Mood normal.         Behavior: Behavior is cooperative.         Assessment/Plan  Problem List Items Addressed This Visit       Primary hypertension     BP at goal monitor         Dementia (CMS/HCC)     Stable no agitation monitor for behaviors         Weakness - Primary     Continue PT OT          Medications, treatments, and labs reviewed  Continue medications and treatments as listed in EMR    Scribe Attestation  IGwen Scribe   attest that this documentation has been prepared under the direction and in the presence of BROOKS Henderson.     Provider Attestation - Scribe documentation  All medical record entries made by the Scribe were at my direction and personally dictated by me. I have reviewed the chart and agree that the record accurately reflects my personal performance of the history, physical exam, discussion and plan.   BROOKS Henderson      Electronically Signed By: BROOKS Henderson   9/7/23 10:26 AM

## 2023-07-20 ENCOUNTER — NURSING HOME VISIT (OUTPATIENT)
Dept: POST ACUTE CARE | Facility: EXTERNAL LOCATION | Age: 83
End: 2023-07-20
Payer: COMMERCIAL

## 2023-07-20 ENCOUNTER — NURSING HOME VISIT (OUTPATIENT)
Dept: POST ACUTE CARE | Facility: EXTERNAL LOCATION | Age: 83
End: 2023-07-20

## 2023-07-20 DIAGNOSIS — I10 PRIMARY HYPERTENSION: ICD-10-CM

## 2023-07-20 DIAGNOSIS — K21.9 GASTROESOPHAGEAL REFLUX DISEASE WITHOUT ESOPHAGITIS: ICD-10-CM

## 2023-07-20 DIAGNOSIS — F03.90 DEMENTIA, UNSPECIFIED DEMENTIA SEVERITY, UNSPECIFIED DEMENTIA TYPE, UNSPECIFIED WHETHER BEHAVIORAL, PSYCHOTIC, OR MOOD DISTURBANCE OR ANXIETY (MULTI): Primary | ICD-10-CM

## 2023-07-20 DIAGNOSIS — I48.0 PAROXYSMAL ATRIAL FIBRILLATION (MULTI): ICD-10-CM

## 2023-07-20 DIAGNOSIS — I25.10 CORONARY ARTERY DISEASE INVOLVING NATIVE CORONARY ARTERY OF NATIVE HEART WITHOUT ANGINA PECTORIS: Primary | ICD-10-CM

## 2023-07-20 DIAGNOSIS — Z99.2 STAGE 5 CHRONIC KIDNEY DISEASE ON CHRONIC DIALYSIS (MULTI): ICD-10-CM

## 2023-07-20 DIAGNOSIS — F03.90 DEMENTIA, UNSPECIFIED DEMENTIA SEVERITY, UNSPECIFIED DEMENTIA TYPE, UNSPECIFIED WHETHER BEHAVIORAL, PSYCHOTIC, OR MOOD DISTURBANCE OR ANXIETY (MULTI): ICD-10-CM

## 2023-07-20 DIAGNOSIS — I63.9 CEREBROVASCULAR ACCIDENT (CVA), UNSPECIFIED MECHANISM (MULTI): ICD-10-CM

## 2023-07-20 DIAGNOSIS — N18.6 STAGE 5 CHRONIC KIDNEY DISEASE ON CHRONIC DIALYSIS (MULTI): ICD-10-CM

## 2023-07-20 DIAGNOSIS — R53.1 WEAKNESS: ICD-10-CM

## 2023-07-20 PROCEDURE — 99308 SBSQ NF CARE LOW MDM 20: CPT | Performed by: INTERNAL MEDICINE

## 2023-07-20 PROCEDURE — 99309 SBSQ NF CARE MODERATE MDM 30: CPT

## 2023-07-20 NOTE — LETTER
Patient: Erinn Eagle  : 1940    Encounter Date: 2023    PROGRESS NOTE    Subjective  Chief complaint: Erinn Eagle is a 83 y.o. female who is an acute skilled patient being seen and evaluated for weakness    HPI:  Patient was admitted to the hospital for back pain. Patient was started on vanco and Zosyn for HTN. Patient's back pain was evaluated by PT and OT. Pt was stable and discharged to a SNF for back pain.     23 Patient offers no complaints at time. LBP controlled w/ oxycodone.  Afib stable. Denies hot/cold intolerance, diarrhea/constipation. Denies f/c/n/v/d.  HD per schedule.Cont' w/ therapy for weakness, gait and strengthening. No concerns per nursing.    23   Patient at baseline mentation.  Offers no complaints of SOB, cough, chest pain, F/C/N/V/D.   LBP controlled.  Continues with PT for strengthening.  BLE ADRIANE, followed by wound care.  No concerns per nursing    23 Patient offers no  complaints at time.  Cont' w/ PT for gait, mobility and strengthening. BLE wounds ADRIANE- followed by wound care.   Offers no complaints of F/C/N/V/D, SOB, chest pain.  No concerns per nursing      Objective  Vital signs: 100/81-96.3-79-18-98%    Physical Exam  HENT:      Head: Normocephalic.      Mouth/Throat:      Mouth: Mucous membranes are moist.   Eyes:      Extraocular Movements: Extraocular movements intact.   Cardiovascular:      Rate and Rhythm: Normal rate and regular rhythm.      Pulses: Normal pulses.      Heart sounds: Normal heart sounds.   Pulmonary:      Effort: Pulmonary effort is normal.      Breath sounds: Normal breath sounds.      Comments:  continuous O2  Abdominal:      General: Bowel sounds are normal.      Palpations: Abdomen is soft.   Musculoskeletal:         General: Normal range of motion.      Cervical back: Normal range of motion and neck supple.      Comments:  BLE weakness   BLE wounds   Skin:     General: Skin is warm and dry.      Capillary Refill:  Capillary refill takes 2 to 3 seconds.   Neurological:      Mental Status: She is alert. Mental status is at baseline.   Psychiatric:         Mood and Affect: Mood normal.         Behavior: Behavior normal.         Assessment/Plan  Problem List Items Addressed This Visit       Paroxysmal atrial fibrillation (CMS/HCC)      Stable   no SOB dizziness palpitations   Eliquis   bleeding precautions         Primary hypertension      Stable   Monitor   Aldactone Lasix           Renal failure      Stable   HD M-W-F         Dementia (CMS/HCC) - Primary      Stable   Monitor   no agitation, aggressive behavior per nursing   continue current therapy          Medications, treatments, and labs reviewed  Continue medications and treatments as listed in EMR    BROOKS Roper      Electronically Signed By: BROOKS Roper   7/20/23  6:36 PM

## 2023-07-20 NOTE — PROGRESS NOTES
PROGRESS NOTE    Subjective   Chief complaint: Erinn Eagle is a 83 y.o. female who is an acute skilled patient being seen and evaluated for weakness    HPI:  Patient was admitted to the hospital for back pain. Patient was started on vanco and Zosyn for HTN. Patient's back pain was evaluated by PT and OT. Pt was stable and discharged to a SNF for back pain.     7/6/23 Patient offers no complaints at time. LBP controlled w/ oxycodone.  Afib stable. Denies hot/cold intolerance, diarrhea/constipation. Denies f/c/n/v/d.  HD per schedule.Cont' w/ therapy for weakness, gait and strengthening. No concerns per nursing.    7/13/23   Patient at baseline mentation.  Offers no complaints of SOB, cough, chest pain, F/C/N/V/D.   LBP controlled.  Continues with PT for strengthening.  BLE ADRIANE, followed by wound care.  No concerns per nursing    7/20/23 Patient offers no  complaints at time.  Cont' w/ PT for gait, mobility and strengthening. BLE wounds ADRIANE- followed by wound care.   Offers no complaints of F/C/N/V/D, SOB, chest pain.  No concerns per nursing      Objective   Vital signs: 100/81-96.3-79-18-98%    Physical Exam  HENT:      Head: Normocephalic.      Mouth/Throat:      Mouth: Mucous membranes are moist.   Eyes:      Extraocular Movements: Extraocular movements intact.   Cardiovascular:      Rate and Rhythm: Normal rate and regular rhythm.      Pulses: Normal pulses.      Heart sounds: Normal heart sounds.   Pulmonary:      Effort: Pulmonary effort is normal.      Breath sounds: Normal breath sounds.      Comments:  continuous O2  Abdominal:      General: Bowel sounds are normal.      Palpations: Abdomen is soft.   Musculoskeletal:         General: Normal range of motion.      Cervical back: Normal range of motion and neck supple.      Comments:  BLE weakness   BLE wounds   Skin:     General: Skin is warm and dry.      Capillary Refill: Capillary refill takes 2 to 3 seconds.   Neurological:      Mental Status: She is  alert. Mental status is at baseline.   Psychiatric:         Mood and Affect: Mood normal.         Behavior: Behavior normal.         Assessment/Plan   Problem List Items Addressed This Visit       Paroxysmal atrial fibrillation (CMS/HCC)      Stable   no SOB dizziness palpitations   Eliquis   bleeding precautions         Primary hypertension      Stable   Monitor   Aldactone Lasix           Renal failure      Stable   HD M-W-F         Dementia (CMS/HCC) - Primary      Stable   Monitor   no agitation, aggressive behavior per nursing   continue current therapy          Medications, treatments, and labs reviewed  Continue medications and treatments as listed in EMR    MODESTA Roper-CNP

## 2023-07-20 NOTE — LETTER
Patient: Erinn Eagle  : 1940    Encounter Date: 2023    PROGRESS NOTE    Subjective  Chief complaint: Erinn Eagle is a 83 y.o. female who is an acute skilled patient being seen and evaluated for weakness    HPI:  23 Patient was admitted to the hospital for back pain. Patient was started on vanco and Zosyn for HTN. Patient's back pain was evaluated by PT and OT. Pt was stable and discharged to a SNF for back pain.     23 Patient offers no complaints at time. LBP controlled w/ oxycodone.  Afib stable. Denies hot/cold intolerance, diarrhea/constipation. Denies f/c/n/v/d.  HD per schedule.Cont' w/ therapy for weakness, gait and strengthening. No concerns per nursing.    23   Therapy working with the patient on various BLE therapy exercises to improve strength and functional mobility.  Also worked on skilled interventions focused on weight shifting.  Completed 3 sets of 10 reps.  Patient is stable.  No new concerns reported by nursing.    7/10/23  Patient skilled for therapy due to weakness is working in therapy to increase strength and improve functional mobility.  Requires Min/CGA for bed mobility and Mod A for sit to stand transfers.  Patient is stable.  Denies n/v/f/c.  No new concerns reported by nursing.    23   Patient at baseline mentation.  Offers no complaints of SOB, cough, chest pain, F/C/N/V/D.   LBP controlled.  Continues with PT for strengthening.  BLE ADRIANE, followed by wound care.  No concerns per nursing    23    Patient continues to participate in therapy.  Working on bilateral lower extremity exercises.  Completed 2 sets of 15 reps.  Also working on lower extremity exercises.  Completed 3 sets of 5 reps.  In addition patient working on transfer training and bed mobility.  Requires partial to moderate assist for sit to stand.  Patient is stable without complaint.  No new concerns per nursing.    23    Patient has been working in therapy to improve  strength, endurance, and ADLs.  Patient continues to work toward goals.  Patient has no new concerns today.  Denies n/v/f/c pain.  No concerns reported by staff.    7/19/23 Therapy has been working with the patient to improve strength and endurance with ADLs, transfers, and mobility.  Patient continues to work toward goals.  Patient is stable and has no new complaints.  Nursing staff voices no new concerns today.    7/20/23 Patient has no acute complaints.  No new issues per nursing.  Continues working in therapy.        Objective  Vital signs: 96.3, 79, 98%    Physical Exam  Constitutional:       General: She is not in acute distress.  Eyes:      Extraocular Movements: Extraocular movements intact.   Pulmonary:      Effort: Pulmonary effort is normal.   Musculoskeletal:      Cervical back: Neck supple.   Neurological:      Mental Status: She is alert.   Psychiatric:         Mood and Affect: Mood normal.         Behavior: Behavior is cooperative.         Assessment/Plan  Problem List Items Addressed This Visit       Paroxysmal atrial fibrillation (CMS/HCC)    Coronary artery disease involving native coronary artery - Primary    CVA (cerebral vascular accident) (CMS/HCC)    Primary hypertension    GERD (gastroesophageal reflux disease)    Dementia (CMS/HCC)    Weakness     Medications, treatments, and labs reviewed  Continue medications and treatments as listed in [facility]    Scribe Attestation  IOpal Scribe   attest that this documentation has been prepared under the direction and in the presence of Brandan Yan DO.    Provider Attestation - Scribe documentation  All medical record entries made by the Scribe were at my direction and personally dictated by me. I have reviewed the chart and agree that the record accurately reflects my personal performance of the history, physical exam, discussion and plan.    Brandan Yan DO        Electronically Signed By: Brandan Yan DO   10/1/23   9:42 PM

## 2023-07-21 ENCOUNTER — NURSING HOME VISIT (OUTPATIENT)
Dept: POST ACUTE CARE | Facility: EXTERNAL LOCATION | Age: 83
End: 2023-07-21
Payer: COMMERCIAL

## 2023-07-21 DIAGNOSIS — N18.6 STAGE 5 CHRONIC KIDNEY DISEASE ON CHRONIC DIALYSIS (MULTI): ICD-10-CM

## 2023-07-21 DIAGNOSIS — F03.90 DEMENTIA, UNSPECIFIED DEMENTIA SEVERITY, UNSPECIFIED DEMENTIA TYPE, UNSPECIFIED WHETHER BEHAVIORAL, PSYCHOTIC, OR MOOD DISTURBANCE OR ANXIETY (MULTI): ICD-10-CM

## 2023-07-21 DIAGNOSIS — K21.9 GASTROESOPHAGEAL REFLUX DISEASE WITHOUT ESOPHAGITIS: ICD-10-CM

## 2023-07-21 DIAGNOSIS — R53.1 WEAKNESS: Primary | ICD-10-CM

## 2023-07-21 DIAGNOSIS — Z99.2 STAGE 5 CHRONIC KIDNEY DISEASE ON CHRONIC DIALYSIS (MULTI): ICD-10-CM

## 2023-07-21 PROCEDURE — 99308 SBSQ NF CARE LOW MDM 20: CPT | Performed by: REGISTERED NURSE

## 2023-07-21 NOTE — LETTER
Patient: Erinn Eagle  : 1940    Encounter Date: 2023    PROGRESS NOTE  Subjective  Chief complaint: Erinn Eagle is a 83 y.o. female who is a acute skilled care patient being seen and evaluated for weakness    HPI:  HPI patient seen at bedside denies complaints continues to participate with therapy  Objective  Vital signs: 117/57  Physical Exam  Constitutional:       General: She is not in acute distress.  Eyes:      Extraocular Movements: Extraocular movements intact.   Pulmonary:      Effort: Pulmonary effort is normal.   Musculoskeletal:      Cervical back: Neck supple.   Neurological:      Mental Status: She is alert.   Psychiatric:         Mood and Affect: Mood normal.         Behavior: Behavior is cooperative.         Assessment/Plan  Problem List Items Addressed This Visit       Renal failure     Continue dialysis         GERD (gastroesophageal reflux disease)     Stable monitor         Dementia (CMS/HCC)     Stable no agitation monitor for behaviors         Weakness - Primary     Continue PT OT          Medications, treatments, and labs reviewed  Continue medications and treatments as listed in EMR    BROOKS Henderson      Electronically Signed By: BROOKS Henderson   23 12:20 PM

## 2023-07-24 ENCOUNTER — NURSING HOME VISIT (OUTPATIENT)
Dept: POST ACUTE CARE | Facility: EXTERNAL LOCATION | Age: 83
End: 2023-07-24
Payer: COMMERCIAL

## 2023-07-24 DIAGNOSIS — K21.9 GASTROESOPHAGEAL REFLUX DISEASE WITHOUT ESOPHAGITIS: ICD-10-CM

## 2023-07-24 DIAGNOSIS — F03.90 DEMENTIA, UNSPECIFIED DEMENTIA SEVERITY, UNSPECIFIED DEMENTIA TYPE, UNSPECIFIED WHETHER BEHAVIORAL, PSYCHOTIC, OR MOOD DISTURBANCE OR ANXIETY (MULTI): ICD-10-CM

## 2023-07-24 DIAGNOSIS — R53.1 WEAKNESS: Primary | ICD-10-CM

## 2023-07-24 DIAGNOSIS — N18.6 STAGE 5 CHRONIC KIDNEY DISEASE ON CHRONIC DIALYSIS (MULTI): ICD-10-CM

## 2023-07-24 DIAGNOSIS — I10 PRIMARY HYPERTENSION: ICD-10-CM

## 2023-07-24 DIAGNOSIS — Z99.2 STAGE 5 CHRONIC KIDNEY DISEASE ON CHRONIC DIALYSIS (MULTI): ICD-10-CM

## 2023-07-24 PROCEDURE — 99309 SBSQ NF CARE MODERATE MDM 30: CPT | Performed by: REGISTERED NURSE

## 2023-07-24 NOTE — LETTER
Patient: Erinn Eagle  : 1940    Encounter Date: 2023    PROGRESS NOTE  Subjective  Chief complaint: Erinn Eagle is a 83 y.o. female who is a long term resident patient being seen and evaluated for monthly visit and general medical care    HPI:  HPI patient denies complaints at this time  Objective  Vital signs: 132/78  Physical Exam  Constitutional:       General: She is not in acute distress.  Eyes:      Extraocular Movements: Extraocular movements intact.   Pulmonary:      Effort: Pulmonary effort is normal.   Musculoskeletal:      Cervical back: Neck supple.   Neurological:      Mental Status: She is alert.   Psychiatric:         Mood and Affect: Mood normal.         Behavior: Behavior is cooperative.         Assessment/Plan  Problem List Items Addressed This Visit       Primary hypertension     BP at goal monitor         Renal failure     Continue dialysis         GERD (gastroesophageal reflux disease)     Stable monitor         Dementia (CMS/HCC)     Stable no agitation monitor for behaviors         Weakness - Primary     Continue PT OT          Medications, treatments, and labs reviewed  Continue medications and treatments as listed in EMR    BROOKS Henderson      Electronically Signed By: BROOKS Henderson   23  6:07 PM

## 2023-07-25 ENCOUNTER — NURSING HOME VISIT (OUTPATIENT)
Dept: POST ACUTE CARE | Facility: EXTERNAL LOCATION | Age: 83
End: 2023-07-25
Payer: COMMERCIAL

## 2023-07-25 DIAGNOSIS — F03.90 DEMENTIA, UNSPECIFIED DEMENTIA SEVERITY, UNSPECIFIED DEMENTIA TYPE, UNSPECIFIED WHETHER BEHAVIORAL, PSYCHOTIC, OR MOOD DISTURBANCE OR ANXIETY (MULTI): ICD-10-CM

## 2023-07-25 DIAGNOSIS — R53.1 WEAKNESS: Primary | ICD-10-CM

## 2023-07-25 DIAGNOSIS — I10 PRIMARY HYPERTENSION: ICD-10-CM

## 2023-07-25 PROCEDURE — 99308 SBSQ NF CARE LOW MDM 20: CPT | Performed by: REGISTERED NURSE

## 2023-07-25 NOTE — LETTER
Patient: Erinn Eagle  : 1940    Encounter Date: 2023    PROGRESS NOTE    Subjective  Chief complaint: Erinn Eagle is a 83 y.o. female who is an acute skilled patient being seen and evaluated for weakness    HPI:  Patient was admitted to the hospital for back pain. Patient was started on vanco and Zosyn for HTN. Patient's back pain was evaluated by PT and OT. Pt was stable and discharged to a SNF for back pain.     23 Patient offers no complaints at time. LBP controlled w/ oxycodone.  Afib stable. Denies hot/cold intolerance, diarrhea/constipation. Denies f/c/n/v/d.  HD per schedule.Cont' w/ therapy for weakness, gait and strengthening. No concerns per nursing.    23   Patient at baseline mentation.  Offers no complaints of SOB, cough, chest pain, F/C/N/V/D.   LBP controlled.  Continues with PT for strengthening.  BLE ADRIANE, followed by wound care.  No concerns per nursing    23 Patient offers no  complaints at time.  Cont' w/ PT for gait, mobility and strengthening. BLE wounds ADRIANE- followed by wound care.   Offers no complaints of F/C/N/V/D, SOB, chest pain.  No concerns per nursing.    23 Therapy has been working with the patient to improve strength and endurance with ADLs, transfers, and mobility.  Patient continues to work toward goals.  Patient is stable and has no new complaints.  Nursing staff voices no new concerns today.      Objective  Vital signs: 115/66,74,97% RA    Physical Exam  Constitutional:       General: She is not in acute distress.  Eyes:      Extraocular Movements: Extraocular movements intact.   Pulmonary:      Effort: Pulmonary effort is normal.   Musculoskeletal:      Cervical back: Neck supple.   Neurological:      Mental Status: She is alert.   Psychiatric:         Mood and Affect: Mood normal.         Behavior: Behavior is cooperative.         Assessment/Plan  Problem List Items Addressed This Visit       Primary hypertension     BP at goal  monitor         Dementia (CMS/[facility])     Stable no agitation monitor for behaviors         Weakness - Primary     Continue PT OT          Medications, treatments, and labs reviewed  Continue medications and treatments as listed in EMR    Scribe Attestation  Gwen WEISS Scribe   attest that this documentation has been prepared under the direction and in the presence of BROOKS Henderson.     Provider Attestation - Scribe documentation  All medical record entries made by the Scribe were at my direction and personally dictated by me. I have reviewed the chart and agree that the record accurately reflects my personal performance of the history, physical exam, discussion and plan.   BROOKS Henderson      Electronically Signed By: BROOKS Henderson   8/29/23  1:08 PM

## 2023-07-26 ENCOUNTER — NURSING HOME VISIT (OUTPATIENT)
Dept: POST ACUTE CARE | Facility: EXTERNAL LOCATION | Age: 83
End: 2023-07-26
Payer: COMMERCIAL

## 2023-07-26 DIAGNOSIS — F03.90 DEMENTIA, UNSPECIFIED DEMENTIA SEVERITY, UNSPECIFIED DEMENTIA TYPE, UNSPECIFIED WHETHER BEHAVIORAL, PSYCHOTIC, OR MOOD DISTURBANCE OR ANXIETY (MULTI): Primary | ICD-10-CM

## 2023-07-26 DIAGNOSIS — R53.1 WEAKNESS: ICD-10-CM

## 2023-07-26 DIAGNOSIS — I10 PRIMARY HYPERTENSION: ICD-10-CM

## 2023-07-26 DIAGNOSIS — N18.6 STAGE 5 CHRONIC KIDNEY DISEASE ON CHRONIC DIALYSIS (MULTI): ICD-10-CM

## 2023-07-26 DIAGNOSIS — Z99.2 STAGE 5 CHRONIC KIDNEY DISEASE ON CHRONIC DIALYSIS (MULTI): ICD-10-CM

## 2023-07-26 PROCEDURE — 99309 SBSQ NF CARE MODERATE MDM 30: CPT | Performed by: REGISTERED NURSE

## 2023-07-26 NOTE — LETTER
Patient: Erinn Eagle  : 1940    Encounter Date: 2023    PROGRESS NOTE    Subjective  Chief complaint: Erinn Eagle is a 83 y.o. female who is an acute skilled patient being seen and evaluated for weakness    HPI:  Patient was admitted to the hospital for back pain. Patient was started on vanco and Zosyn for HTN. Patient's back pain was evaluated by PT and OT. Pt was stable and discharged to a SNF for back pain.     23 Patient offers no complaints at time. LBP controlled w/ oxycodone.  Afib stable. Denies hot/cold intolerance, diarrhea/constipation. Denies f/c/n/v/d.  HD per schedule.Cont' w/ therapy for weakness, gait and strengthening. No concerns per nursing.    23   Patient at baseline mentation.  Offers no complaints of SOB, cough, chest pain, F/C/N/V/D.   LBP controlled.  Continues with PT for strengthening.  BLE ADRIANE, followed by wound care.  No concerns per nursing    23 Patient offers no  complaints at time.  Cont' w/ PT for gait, mobility and strengthening. BLE wounds ADRIANE- followed by wound care.   Offers no complaints of F/C/N/V/D, SOB, chest pain.  No concerns per nursing.    23 Therapy has been working with the patient to improve strength and endurance with ADLs, transfers, and mobility.  Patient continues to work toward goals.  Patient is stable and has no new complaints.  Nursing staff voices no new concerns today.    23  Patient continues working with therapy to reach goals. Patient completing multiple therapeutic exercises to increase strength, mobility and flexibility. Skilled interventions focusing on bed mobility d\t pt refusing to get out of bed. Patient continues to require assistance with ADLs. Patient is stable with no new nursing concerns reported.       Objective  Vital signs: 121/58,78,99% O2 via NC    Physical Exam  Constitutional:       General: She is not in acute distress.  Eyes:      Extraocular Movements: Extraocular movements intact.    Pulmonary:      Effort: Pulmonary effort is normal.   Musculoskeletal:      Cervical back: Neck supple.   Neurological:      Mental Status: She is alert.   Psychiatric:         Mood and Affect: Mood normal.         Behavior: Behavior is cooperative.         Assessment/Plan  Problem List Items Addressed This Visit       Primary hypertension     BP at goal monitor         Renal failure     Continue dialysis         Dementia (CMS/[facility]) - Primary     Stable no agitation monitor for behaviors         Weakness     Continue PT OT          Medications, treatments, and labs reviewed  Continue medications and treatments as listed in EMR    Scribe Attestation  IGwen Scribe   attest that this documentation has been prepared under the direction and in the presence of BROOKS Henderson.     Provider Attestation - Scribe documentation  All medical record entries made by the Scribe were at my direction and personally dictated by me. I have reviewed the chart and agree that the record accurately reflects my personal performance of the history, physical exam, discussion and plan.   BROOKS Henderson      Electronically Signed By: BROOKS Henderson   8/31/23 11:02 AM

## 2023-08-08 ENCOUNTER — NURSING HOME VISIT (OUTPATIENT)
Dept: POST ACUTE CARE | Facility: EXTERNAL LOCATION | Age: 83
End: 2023-08-08
Payer: COMMERCIAL

## 2023-08-08 DIAGNOSIS — N18.6 STAGE 5 CHRONIC KIDNEY DISEASE ON CHRONIC DIALYSIS (MULTI): ICD-10-CM

## 2023-08-08 DIAGNOSIS — Z99.2 STAGE 5 CHRONIC KIDNEY DISEASE ON CHRONIC DIALYSIS (MULTI): ICD-10-CM

## 2023-08-08 DIAGNOSIS — F03.90 DEMENTIA, UNSPECIFIED DEMENTIA SEVERITY, UNSPECIFIED DEMENTIA TYPE, UNSPECIFIED WHETHER BEHAVIORAL, PSYCHOTIC, OR MOOD DISTURBANCE OR ANXIETY (MULTI): ICD-10-CM

## 2023-08-08 DIAGNOSIS — I10 PRIMARY HYPERTENSION: ICD-10-CM

## 2023-08-08 DIAGNOSIS — R82.90 URINE ABNORMALITY: ICD-10-CM

## 2023-08-08 DIAGNOSIS — R53.1 WEAKNESS: Primary | ICD-10-CM

## 2023-08-08 PROCEDURE — 99309 SBSQ NF CARE MODERATE MDM 30: CPT | Performed by: REGISTERED NURSE

## 2023-08-08 NOTE — LETTER
Patient: Erinn Eagle  : 1940    Encounter Date: 2023    PROGRESS NOTE    Subjective  Chief complaint: Erinn Eagle is a 83 y.o. female who is an acute skilled patient being seen and evaluated for weakness    HPI:  Patient was admitted to the hospital for back pain. Patient was started on vanco and Zosyn for HTN. Patient's back pain was evaluated by PT and OT. Pt was stable and discharged to a SNF for back pain.     23 Patient offers no complaints at time. LBP controlled w/ oxycodone.  Afib stable. Denies hot/cold intolerance, diarrhea/constipation. Denies f/c/n/v/d.  HD per schedule.Cont' w/ therapy for weakness, gait and strengthening. No concerns per nursing.    23   Patient at baseline mentation.  Offers no complaints of SOB, cough, chest pain, F/C/N/V/D.   LBP controlled.  Continues with PT for strengthening.  BLE ADRIANE, followed by wound care.  No concerns per nursing    23 Patient offers no  complaints at time.  Cont' w/ PT for gait, mobility and strengthening. BLE wounds ADRIANE- followed by wound care.   Offers no complaints of F/C/N/V/D, SOB, chest pain.  No concerns per nursing.    23 Therapy has been working with the patient to improve strength and endurance with ADLs, transfers, and mobility.  Patient continues to work toward goals.  Patient is stable and has no new complaints.  Nursing staff voices no new concerns today.    23  Patient continues working with therapy to reach goals. Patient completing multiple therapeutic exercises to increase strength, mobility and flexibility. Skilled interventions focusing on bed mobility d\t pt refusing to get out of bed. Patient continues to require assistance with ADLs. Patient is stable with no new nursing concerns reported.     23 Patient had reports of cloudy urine. Patient presents for f/u therapy and general medical care.  Patient seen and examined at beside.  Therapy has been working with the patient to improve  strength, endurance, ADLs, and transfers d/t generalized weakness.      Objective  Vital signs: 121/59, 97.1, 18, 71, 95%    Physical Exam  Constitutional:       General: She is not in acute distress.  Eyes:      Extraocular Movements: Extraocular movements intact.   Pulmonary:      Effort: Pulmonary effort is normal.   Musculoskeletal:      Cervical back: Neck supple.   Neurological:      Mental Status: She is alert.   Psychiatric:         Mood and Affect: Mood normal.         Behavior: Behavior is cooperative.         Assessment/Plan  Problem List Items Addressed This Visit       Primary hypertension     BP at goal monitor         Renal failure     Continue dialysis         Dementia (CMS/[facility])     Stable no agitation monitor for behaviors         Weakness - Primary     Continue PT OT          Other Visit Diagnoses       Urine abnormality        cloudy/ milky  urine get uacns          Medications, treatments, and labs reviewed  Continue medications and treatments as listed in [facility]    Scribe Attestation  IOpal Scribe   attest that this documentation has been prepared under the direction and in the presence of BROOKS Henderson.    Provider Attestation - Scribe documentation  All medical record entries made by the Scribe were at my direction and personally dictated by me. I have reviewed the chart and agree that the record accurately reflects my personal performance of the history, physical exam, discussion and plan.    BROOKS Henderson        Electronically Signed By: BROOKS Henderson   9/14/23 12:18 PM

## 2023-08-10 ENCOUNTER — NURSING HOME VISIT (OUTPATIENT)
Dept: POST ACUTE CARE | Facility: EXTERNAL LOCATION | Age: 83
End: 2023-08-10
Payer: COMMERCIAL

## 2023-08-10 DIAGNOSIS — I48.0 PAROXYSMAL ATRIAL FIBRILLATION (MULTI): ICD-10-CM

## 2023-08-10 DIAGNOSIS — R53.1 WEAKNESS: ICD-10-CM

## 2023-08-10 DIAGNOSIS — F03.90 DEMENTIA, UNSPECIFIED DEMENTIA SEVERITY, UNSPECIFIED DEMENTIA TYPE, UNSPECIFIED WHETHER BEHAVIORAL, PSYCHOTIC, OR MOOD DISTURBANCE OR ANXIETY (MULTI): Primary | ICD-10-CM

## 2023-08-10 DIAGNOSIS — Z99.2 STAGE 5 CHRONIC KIDNEY DISEASE ON CHRONIC DIALYSIS (MULTI): ICD-10-CM

## 2023-08-10 DIAGNOSIS — R23.9 IMPAIRED SKIN INTEGRITY: ICD-10-CM

## 2023-08-10 DIAGNOSIS — K21.9 GASTROESOPHAGEAL REFLUX DISEASE WITHOUT ESOPHAGITIS: ICD-10-CM

## 2023-08-10 DIAGNOSIS — N18.6 STAGE 5 CHRONIC KIDNEY DISEASE ON CHRONIC DIALYSIS (MULTI): ICD-10-CM

## 2023-08-10 PROCEDURE — 99309 SBSQ NF CARE MODERATE MDM 30: CPT

## 2023-08-10 NOTE — LETTER
Patient: Erinn Eagle  : 1940    Encounter Date: 08/10/2023    PROGRESS NOTE    Subjective  Chief complaint: Erinn Eagle is a 83 y.o. female who is an acute skilled patient being seen and evaluated for weakness    HPI:  Patient was admitted to the hospital for back pain. Patient was started on vanco and Zosyn for HTN. Patient's back pain was evaluated by PT and OT. Pt was stable and discharged to a SNF for back pain.     23 Patient offers no complaints at time. LBP controlled w/ oxycodone.  Afib stable. Denies hot/cold intolerance, diarrhea/constipation. Denies f/c/n/v/d.  HD per schedule.Cont' w/ therapy for weakness, gait and strengthening. No concerns per nursing.    23   Patient at baseline mentation.  Offers no complaints of SOB, cough, chest pain, F/C/N/V/D.   LBP controlled.  Continues with PT for strengthening.  BLE ADRIANE, followed by wound care.  No concerns per nursing    23 Patient offers no  complaints at time.  Cont' w/ PT for gait, mobility and strengthening. BLE wounds ADRIANE- followed by wound care.   Offers no complaints of F/C/N/V/D, SOB, chest pain.  No concerns per nursing    8/10/23 Patient resting quietly in bed.   Baseline mentation- offers no complaints at time.  Continues to work with PT for gait training, strengthening, mobility. Continues HD.  Pain managed.  HTN stable.  BLE wound care per wound care nurse.      Objective  Vital signs: 118/67-62-18    Physical Exam  Constitutional:       Appearance: Normal appearance.   HENT:      Head: Normocephalic.      Mouth/Throat:      Mouth: Mucous membranes are moist.   Eyes:      Extraocular Movements: Extraocular movements intact.   Cardiovascular:      Rate and Rhythm: Normal rate. Rhythm irregular.      Pulses: Normal pulses.      Heart sounds: Murmur heard.   Pulmonary:      Effort: Pulmonary effort is normal.      Breath sounds: Normal breath sounds.   Abdominal:      General: Bowel sounds are normal.       Palpations: Abdomen is soft.   Musculoskeletal:         General: Normal range of motion.      Cervical back: Normal range of motion and neck supple.      Comments:   Generalized weakness   BLE wounds   Skin:     General: Skin is warm and dry.      Capillary Refill: Capillary refill takes 2 to 3 seconds.   Neurological:      Mental Status: She is alert. Mental status is at baseline.   Psychiatric:         Mood and Affect: Mood normal.         Behavior: Behavior normal.         Assessment/Plan  Problem List Items Addressed This Visit       Paroxysmal atrial fibrillation (CMS/HCC)      Stable   heart rate controlled   Apixaban   bleeding precaution   monitor         Impaired skin integrity      BLE wounds followed by wound care   monitor         Renal failure      HD as scheduled         GERD (gastroesophageal reflux disease)      Stable   sit up after meals   Famotidine   monitor         Dementia (CMS/[facility]) - Primary      Stable  No agitation or outbursts per nursing   monitor for changes in behavior         Weakness      therapy          Medications, treatments, and labs reviewed  Continue medications and treatments as listed in EMR    BROOKS Roper      Electronically Signed By: BROOKS Roper   8/12/23 11:06 PM

## 2023-08-12 PROBLEM — R53.1 WEAKNESS: Status: ACTIVE | Noted: 2023-08-12

## 2023-08-13 NOTE — PROGRESS NOTES
PROGRESS NOTE    Subjective   Chief complaint: Erinn Eagle is a 83 y.o. female who is an acute skilled patient being seen and evaluated for weakness    HPI:  Patient was admitted to the hospital for back pain. Patient was started on vanco and Zosyn for HTN. Patient's back pain was evaluated by PT and OT. Pt was stable and discharged to a SNF for back pain.     7/6/23 Patient offers no complaints at time. LBP controlled w/ oxycodone.  Afib stable. Denies hot/cold intolerance, diarrhea/constipation. Denies f/c/n/v/d.  HD per schedule.Cont' w/ therapy for weakness, gait and strengthening. No concerns per nursing.    7/13/23   Patient at baseline mentation.  Offers no complaints of SOB, cough, chest pain, F/C/N/V/D.   LBP controlled.  Continues with PT for strengthening.  BLE ADRIANE, followed by wound care.  No concerns per nursing    7/20/23 Patient offers no  complaints at time.  Cont' w/ PT for gait, mobility and strengthening. BLE wounds ADRIANE- followed by wound care.   Offers no complaints of F/C/N/V/D, SOB, chest pain.  No concerns per nursing    8/10/23 Patient resting quietly in bed.   Baseline mentation- offers no complaints at time.  Continues to work with PT for gait training, strengthening, mobility. Continues HD.  Pain managed.  HTN stable.  BLE wound care per wound care nurse.      Objective   Vital signs: 118/67-62-18    Physical Exam  Constitutional:       Appearance: Normal appearance.   HENT:      Head: Normocephalic.      Mouth/Throat:      Mouth: Mucous membranes are moist.   Eyes:      Extraocular Movements: Extraocular movements intact.   Cardiovascular:      Rate and Rhythm: Normal rate. Rhythm irregular.      Pulses: Normal pulses.      Heart sounds: Murmur heard.   Pulmonary:      Effort: Pulmonary effort is normal.      Breath sounds: Normal breath sounds.   Abdominal:      General: Bowel sounds are normal.      Palpations: Abdomen is soft.   Musculoskeletal:         General: Normal range of  motion.      Cervical back: Normal range of motion and neck supple.      Comments:   Generalized weakness   BLE wounds   Skin:     General: Skin is warm and dry.      Capillary Refill: Capillary refill takes 2 to 3 seconds.   Neurological:      Mental Status: She is alert. Mental status is at baseline.   Psychiatric:         Mood and Affect: Mood normal.         Behavior: Behavior normal.         Assessment/Plan   Problem List Items Addressed This Visit       Paroxysmal atrial fibrillation (CMS/[facility])      Stable   heart rate controlled   Apixaban   bleeding precaution   monitor         Impaired skin integrity      BLE wounds followed by wound care   monitor         Renal failure      HD as scheduled         GERD (gastroesophageal reflux disease)      Stable   sit up after meals   Famotidine   monitor         Dementia (CMS/[facility]) - Primary      Stable  No agitation or outbursts per nursing   monitor for changes in behavior         Weakness      therapy          Medications, treatments, and labs reviewed  Continue medications and treatments as listed in EMR    MODESTA Roper-CNP

## 2023-08-15 NOTE — PROGRESS NOTES
PROGRESS NOTE    Subjective   Chief complaint: Erinn Eagle is a 83 y.o. female who is a acute skilled care patient being seen and evaluated for weakness.    HPI:  6/23/23 Patient was admitted to the hospital for back pain. Patient was started on vanco and Zosyn for HTN. Patient's back pain was evaluated by PT and OT. Pt was stable and discharged to a SNF for back pain.     7/6/23 Patient offers no complaints at time. LBP controlled w/ oxycodone.  Afib stable. Denies hot/cold intolerance, diarrhea/constipation. Denies f/c/n/v/d.  HD per schedule.Cont' w/ therapy for weakness, gait and strengthening. No concerns per nursing.    7/7/23   Therapy working with the patient on various BLE therapy exercises to improve strength and functional mobility.  Also worked on skilled interventions focused on weight shifting.  Completed 3 sets of 10 reps.  Patient is stable.  No new concerns reported by nursing.      Objective   Vital signs:   18, 110/67, 97.3, 81, 96%  Physical Exam  Constitutional:       General: She is not in acute distress.  Eyes:      Extraocular Movements: Extraocular movements intact.   Pulmonary:      Effort: Pulmonary effort is normal.   Musculoskeletal:      Cervical back: Neck supple.   Neurological:      Mental Status: She is alert.   Psychiatric:         Mood and Affect: Mood normal.         Behavior: Behavior is cooperative.         Assessment/Plan   Problem List Items Addressed This Visit       Renal failure     Continue dialysis         GERD (gastroesophageal reflux disease)     Stable monitor         Dementia (CMS/[facility])     Stable no agitation monitor for behaviors         Weakness - Primary     Continue PT OT          Medications, treatments, and labs reviewed  Continue medications and treatments as listed in EMR    Scribe Attestation  I, Tessy Albarado   attest that this documentation has been prepared under the direction and in the presence of MODESTA Henderson-WONG    Provider  Attestation - Scribe documentation  All medical record entries made by the Scribe were at my direction and personally dictated by me. I have reviewed the chart and agree that the record accurately reflects my personal performance of the history, physical exam, discussion and plan.   Brandan Reed, APRN-CNP

## 2023-08-17 NOTE — ASSESSMENT & PLAN NOTE
Health Maintenance Due   Topic Date Due   • COVID-19 Vaccine (1) Never done   • Influenza Vaccine (1) Never done       Patient is due for topics as listed above but is not proceeding with Immunization(s) COVID-19 and Influenza at this time. Education provided for Immunization(s) COVID-19 and Influenza.   Stable no agitation monitor for behaviors

## 2023-08-23 NOTE — PROGRESS NOTES
PROGRESS NOTE    Subjective   Chief complaint: Erinn Eagle is a 83 y.o. female who is a acute skilled care patient being seen and evaluated for weakness.    HPI:  6/23/23 Patient was admitted to the hospital for back pain. Patient was started on vanco and Zosyn for HTN. Patient's back pain was evaluated by PT and OT. Pt was stable and discharged to a SNF for back pain.     7/6/23 Patient offers no complaints at time. LBP controlled w/ oxycodone.  Afib stable. Denies hot/cold intolerance, diarrhea/constipation. Denies f/c/n/v/d.  HD per schedule.Cont' w/ therapy for weakness, gait and strengthening. No concerns per nursing.    7/7/23   Therapy working with the patient on various BLE therapy exercises to improve strength and functional mobility.  Also worked on skilled interventions focused on weight shifting.  Completed 3 sets of 10 reps.  Patient is stable.  No new concerns reported by nursing.    7/10/23  Patient skilled for therapy due to weakness is working in therapy to increase strength and improve functional mobility.  Requires Min/CGA for bed mobility and Mod A for sit to stand transfers.  Patient is stable.  Denies n/v/f/c.  No new concerns reported by nursing. Patient in right knee immobilizer.      Objective   Vital signs: 118/69  Physical Exam  Constitutional:       General: She is not in acute distress.  Eyes:      Extraocular Movements: Extraocular movements intact.   Pulmonary:      Effort: Pulmonary effort is normal.   Musculoskeletal:      Cervical back: Neck supple.      Comments: Generalized weakness  Right knee immobilizer   Neurological:      Mental Status: She is alert.   Psychiatric:         Mood and Affect: Mood normal.         Behavior: Behavior is cooperative.         Assessment/Plan   Problem List Items Addressed This Visit       Paroxysmal atrial fibrillation (CMS/HCC)    Coronary artery disease involving native coronary artery - Primary    CVA (cerebral vascular accident) (CMS/HCC)     Primary hypertension    Renal failure    GERD (gastroesophageal reflux disease)    Dementia (CMS/[facility])    Weakness     Medications, treatments, and labs reviewed  Continue medications and treatments as listed in EMR    Scribe Attestation  I, Tessy Albarado   attest that this documentation has been prepared under the direction and in the presence of Brandan Yan DO    Provider Attestation - Scribe documentation  All medical record entries made by the Scribe were at my direction and personally dictated by me. I have reviewed the chart and agree that the record accurately reflects my personal performance of the history, physical exam, discussion and plan.   Brandan Yan DO

## 2023-08-28 NOTE — PROGRESS NOTES
PROGRESS NOTE    Subjective   Chief complaint: Erinn Eagle is a 83 y.o. female who is an acute skilled patient being seen and evaluated for weakness    HPI:  Patient was admitted to the hospital for back pain. Patient was started on vanco and Zosyn for HTN. Patient's back pain was evaluated by PT and OT. Pt was stable and discharged to a SNF for back pain.     7/6/23 Patient offers no complaints at time. LBP controlled w/ oxycodone.  Afib stable. Denies hot/cold intolerance, diarrhea/constipation. Denies f/c/n/v/d.  HD per schedule.Cont' w/ therapy for weakness, gait and strengthening. No concerns per nursing.    7/13/23   Patient at baseline mentation.  Offers no complaints of SOB, cough, chest pain, F/C/N/V/D.   LBP controlled.  Continues with PT for strengthening.  BLE ADRIANE, followed by wound care.  No concerns per nursing    7/20/23 Patient offers no  complaints at time.  Cont' w/ PT for gait, mobility and strengthening. BLE wounds ADRIANE- followed by wound care.   Offers no complaints of F/C/N/V/D, SOB, chest pain.  No concerns per nursing.    7/25/23 Therapy has been working with the patient to improve strength and endurance with ADLs, transfers, and mobility.  Patient continues to work toward goals.  Patient is stable and has no new complaints.  Nursing staff voices no new concerns today.      Objective   Vital signs: 115/66,74,97% RA    Physical Exam  Constitutional:       General: She is not in acute distress.  Eyes:      Extraocular Movements: Extraocular movements intact.   Pulmonary:      Effort: Pulmonary effort is normal.   Musculoskeletal:      Cervical back: Neck supple.   Neurological:      Mental Status: She is alert.   Psychiatric:         Mood and Affect: Mood normal.         Behavior: Behavior is cooperative.         Assessment/Plan   Problem List Items Addressed This Visit       Primary hypertension     BP at goal monitor         Dementia (CMS/HCC)     Stable no agitation monitor for  behaviors         Weakness - Primary     Continue PT OT          Medications, treatments, and labs reviewed  Continue medications and treatments as listed in EMR    Scribe Attestation  I, Tessy Espinoza   attest that this documentation has been prepared under the direction and in the presence of BROOKS Henderson.     Provider Attestation - Scribe documentation  All medical record entries made by the Scribe were at my direction and personally dictated by me. I have reviewed the chart and agree that the record accurately reflects my personal performance of the history, physical exam, discussion and plan.   BROOKS Henderson

## 2023-08-28 NOTE — PROGRESS NOTES
PROGRESS NOTE  Subjective   Chief complaint: Erinn Eagle is a 83 y.o. female who is a acute skilled care patient being seen and evaluated for weakness    HPI:  HPI patient seen at bedside denies complaints continues to participate with therapy  Objective   Vital signs: 117/57  Physical Exam  Constitutional:       General: She is not in acute distress.  Eyes:      Extraocular Movements: Extraocular movements intact.   Pulmonary:      Effort: Pulmonary effort is normal.   Musculoskeletal:      Cervical back: Neck supple.   Neurological:      Mental Status: She is alert.   Psychiatric:         Mood and Affect: Mood normal.         Behavior: Behavior is cooperative.         Assessment/Plan   Problem List Items Addressed This Visit       Renal failure     Continue dialysis         GERD (gastroesophageal reflux disease)     Stable monitor         Dementia (CMS/HCC)     Stable no agitation monitor for behaviors         Weakness - Primary     Continue PT OT          Medications, treatments, and labs reviewed  Continue medications and treatments as listed in EMR    MODESTA Henderson-CNP

## 2023-08-28 NOTE — PROGRESS NOTES
PROGRESS NOTE  Subjective   Chief complaint: Erinn Eagle is a 83 y.o. female who is a acute skilled care patient being seen and evaluated for weakness    HPI:  HPI patient continues to participate in therapy denies complaints at this time  Objective   Vital signs: 110/67  Physical Exam  Constitutional:       General: She is not in acute distress.  Eyes:      Extraocular Movements: Extraocular movements intact.   Pulmonary:      Effort: Pulmonary effort is normal.   Musculoskeletal:      Cervical back: Neck supple.   Neurological:      Mental Status: She is alert.   Psychiatric:         Mood and Affect: Mood normal.         Behavior: Behavior is cooperative.         Assessment/Plan   Problem List Items Addressed This Visit       Renal failure     Continue dialysis         Dementia (CMS/[facility])     Stable no agitation monitor for behaviors         Weakness - Primary     Continue PT OT          Medications, treatments, and labs reviewed  Continue medications and treatments as listed in EMR    MODESTA Henderson-CNP

## 2023-08-28 NOTE — PROGRESS NOTES
PROGRESS NOTE    Subjective   Chief complaint: Erinn Eagle is a 83 y.o. female who is a acute skilled care patient being seen and evaluated for weakness.    HPI:  6/23/23 Patient was admitted to the hospital for back pain. Patient was started on vanco and Zosyn for HTN. Patient's back pain was evaluated by PT and OT. Pt was stable and discharged to a SNF for back pain.     7/6/23 Patient offers no complaints at time. LBP controlled w/ oxycodone.  Afib stable. Denies hot/cold intolerance, diarrhea/constipation. Denies f/c/n/v/d.  HD per schedule.Cont' w/ therapy for weakness, gait and strengthening. No concerns per nursing.    7/7/23   Therapy working with the patient on various BLE therapy exercises to improve strength and functional mobility.  Also worked on skilled interventions focused on weight shifting.  Completed 3 sets of 10 reps.  Patient is stable.  No new concerns reported by nursing.    7/10/23  Patient skilled for therapy due to weakness is working in therapy to increase strength and improve functional mobility.  Requires Min/CGA for bed mobility and Mod A for sit to stand transfers.  Patient is stable.  Denies n/v/f/c.  No new concerns reported by nursing.    7/13/23   Patient at baseline mentation.  Offers no complaints of SOB, cough, chest pain, F/C/N/V/D.   LBP controlled.  Continues with PT for strengthening.  BLE ADRIANE, followed by wound care.  No concerns per nursing    7/14/23    Patient continues to participate in therapy.  Working on bilateral lower extremity exercises.  Completed 2 sets of 15 reps.  Also working on lower extremity exercises.  Completed 3 sets of 5 reps.  In addition patient working on transfer training and bed mobility.  Requires partial to moderate assist for sit to stand.  Patient is stable without complaint.  No new concerns per nursing.        Objective   Vital signs: 113/85  Physical Exam  Constitutional:       General: She is not in acute distress.  Eyes:       Extraocular Movements: Extraocular movements intact.   Pulmonary:      Effort: Pulmonary effort is normal.   Musculoskeletal:      Cervical back: Neck supple.   Neurological:      Mental Status: She is alert.   Psychiatric:         Mood and Affect: Mood normal.         Behavior: Behavior is cooperative.         Assessment/Plan   Problem List Items Addressed This Visit       Primary hypertension     BP at goal monitor         Dementia (CMS/[facility])     Stable no agitation monitor for behaviors         Weakness - Primary     Continue PT OT          Medications, treatments, and labs reviewed  Continue medications and treatments as listed in EMR    Scribe Attestation  ICrystal Scribe   attest that this documentation has been prepared under the direction and in the presence of BROOKS Henderson    Provider Attestation - Scribe documentation  All medical record entries made by the Scribe were at my direction and personally dictated by me. I have reviewed the chart and agree that the record accurately reflects my personal performance of the history, physical exam, discussion and plan.   BROOKS Henderson

## 2023-08-28 NOTE — PROGRESS NOTES
PROGRESS NOTE  Subjective   Chief complaint: Erinn Eagle is a 83 y.o. female who is a long term resident patient being seen and evaluated for monthly visit and general medical care    HPI:  HPI patient denies complaints at this time  Objective   Vital signs: 132/78  Physical Exam  Constitutional:       General: She is not in acute distress.  Eyes:      Extraocular Movements: Extraocular movements intact.   Pulmonary:      Effort: Pulmonary effort is normal.   Musculoskeletal:      Cervical back: Neck supple.   Neurological:      Mental Status: She is alert.   Psychiatric:         Mood and Affect: Mood normal.         Behavior: Behavior is cooperative.         Assessment/Plan   Problem List Items Addressed This Visit       Primary hypertension     BP at goal monitor         Renal failure     Continue dialysis         GERD (gastroesophageal reflux disease)     Stable monitor         Dementia (CMS/[facility])     Stable no agitation monitor for behaviors         Weakness - Primary     Continue PT OT          Medications, treatments, and labs reviewed  Continue medications and treatments as listed in EMR    Brandan Reed, APRN-CNP

## 2023-08-28 NOTE — PROGRESS NOTES
PROGRESS NOTE    Subjective   Chief complaint: Erinn Eagle is a 83 y.o. female who is an acute skilled patient being seen and evaluated for weakness    HPI:  Patient was admitted to the hospital for back pain. Patient was started on vanco and Zosyn for HTN. Patient's back pain was evaluated by PT and OT. Pt was stable and discharged to a SNF for back pain.     7/6/23 Patient offers no complaints at time. LBP controlled w/ oxycodone.  Afib stable. Denies hot/cold intolerance, diarrhea/constipation. Denies f/c/n/v/d.  HD per schedule.Cont' w/ therapy for weakness, gait and strengthening. No concerns per nursing.    7/13/23   Patient at baseline mentation.  Offers no complaints of SOB, cough, chest pain, F/C/N/V/D.   LBP controlled.  Continues with PT for strengthening.  BLE ADRIANE, followed by wound care.  No concerns per nursing    7/20/23 Patient offers no  complaints at time.  Cont' w/ PT for gait, mobility and strengthening. BLE wounds ADRIANE- followed by wound care.   Offers no complaints of F/C/N/V/D, SOB, chest pain.  No concerns per nursing.    7/25/23 Therapy has been working with the patient to improve strength and endurance with ADLs, transfers, and mobility.  Patient continues to work toward goals.  Patient is stable and has no new complaints.  Nursing staff voices no new concerns today.    7/26/23  Patient continues working with therapy to reach goals. Patient completing multiple therapeutic exercises to increase strength, mobility and flexibility. Skilled interventions focusing on bed mobility d\t pt refusing to get out of bed. Patient continues to require assistance with ADLs. Patient is stable with no new nursing concerns reported.       Objective   Vital signs: 121/58,78,99% O2 via NC    Physical Exam  Constitutional:       General: She is not in acute distress.  Eyes:      Extraocular Movements: Extraocular movements intact.   Pulmonary:      Effort: Pulmonary effort is normal.   Musculoskeletal:       Cervical back: Neck supple.   Neurological:      Mental Status: She is alert.   Psychiatric:         Mood and Affect: Mood normal.         Behavior: Behavior is cooperative.         Assessment/Plan   Problem List Items Addressed This Visit       Primary hypertension     BP at goal monitor         Renal failure     Continue dialysis         Dementia (CMS/[facility]) - Primary     Stable no agitation monitor for behaviors         Weakness     Continue PT OT          Medications, treatments, and labs reviewed  Continue medications and treatments as listed in EMR    Scribe Attestation  Gwen WEISS Scribe   attest that this documentation has been prepared under the direction and in the presence of BROOKS Henderson.     Provider Attestation - Scribe documentation  All medical record entries made by the Scribe were at my direction and personally dictated by me. I have reviewed the chart and agree that the record accurately reflects my personal performance of the history, physical exam, discussion and plan.   BROOKS Henderson

## 2023-08-29 NOTE — PROGRESS NOTES
PROGRESS NOTE    Subjective   Chief complaint: Erinn Eagle is a 83 y.o. female who is a acute skilled care patient being seen and evaluated for weakness.    HPI:  6/23/23 Patient was admitted to the hospital for back pain. Patient was started on vanco and Zosyn for HTN. Patient's back pain was evaluated by PT and OT. Pt was stable and discharged to a SNF for back pain.     7/6/23 Patient offers no complaints at time. LBP controlled w/ oxycodone.  Afib stable. Denies hot/cold intolerance, diarrhea/constipation. Denies f/c/n/v/d.  HD per schedule.Cont' w/ therapy for weakness, gait and strengthening. No concerns per nursing.    7/7/23   Therapy working with the patient on various BLE therapy exercises to improve strength and functional mobility.  Also worked on skilled interventions focused on weight shifting.  Completed 3 sets of 10 reps.  Patient is stable.  No new concerns reported by nursing.    7/10/23  Patient skilled for therapy due to weakness is working in therapy to increase strength and improve functional mobility.  Requires Min/CGA for bed mobility and Mod A for sit to stand transfers.  Patient is stable.  Denies n/v/f/c.  No new concerns reported by nursing.    7/13/23   Patient at baseline mentation.  Offers no complaints of SOB, cough, chest pain, F/C/N/V/D.   LBP controlled.  Continues with PT for strengthening.  BLE ADRIANE, followed by wound care.  No concerns per nursing    7/14/23    Patient continues to participate in therapy.  Working on bilateral lower extremity exercises.  Completed 2 sets of 15 reps.  Also working on lower extremity exercises.  Completed 3 sets of 5 reps.  In addition patient working on transfer training and bed mobility.  Requires partial to moderate assist for sit to stand.  Patient is stable without complaint.  No new concerns per nursing.    7/18/23    Patient has been working in therapy to improve strength, endurance, and ADLs.  Patient continues to work toward goals.   Patient has no new concerns today.  Denies n/v/f/c pain.  No concerns reported by staff.      Objective   Vital signs: 118/81  Physical Exam  Constitutional:       General: She is not in acute distress.  Eyes:      Extraocular Movements: Extraocular movements intact.   Pulmonary:      Effort: Pulmonary effort is normal.   Musculoskeletal:      Cervical back: Neck supple.   Neurological:      Mental Status: She is alert.   Psychiatric:         Mood and Affect: Mood normal.         Behavior: Behavior is cooperative.         Assessment/Plan   Problem List Items Addressed This Visit       Dementia (CMS/[facility]) - Primary     Stable no agitation monitor for behaviors         Weakness     Continue PT OT          Medications, treatments, and labs reviewed  Continue medications and treatments as listed in EMR    Scribe Attestation  I, Tessy Albarado   attest that this documentation has been prepared under the direction and in the presence of BROOKS Henderson    Provider Attestation - Scribe documentation  All medical record entries made by the Scribe were at my direction and personally dictated by me. I have reviewed the chart and agree that the record accurately reflects my personal performance of the history, physical exam, discussion and plan.   BROOKS Henderson

## 2023-09-05 NOTE — PROGRESS NOTES
PROGRESS NOTE    Subjective   Chief complaint: Erinn Eagle is a 83 y.o. female who is an acute skilled patient being seen and evaluated for weakness    HPI:  6/23/23 Patient was admitted to the hospital for back pain. Patient was started on vanco and Zosyn for HTN. Patient's back pain was evaluated by PT and OT. Pt was stable and discharged to a SNF for back pain.     7/6/23 Patient offers no complaints at time. LBP controlled w/ oxycodone.  Afib stable. Denies hot/cold intolerance, diarrhea/constipation. Denies f/c/n/v/d.  HD per schedule.Cont' w/ therapy for weakness, gait and strengthening. No concerns per nursing.    7/7/23   Therapy working with the patient on various BLE therapy exercises to improve strength and functional mobility.  Also worked on skilled interventions focused on weight shifting.  Completed 3 sets of 10 reps.  Patient is stable.  No new concerns reported by nursing.    7/10/23  Patient skilled for therapy due to weakness is working in therapy to increase strength and improve functional mobility.  Requires Min/CGA for bed mobility and Mod A for sit to stand transfers.  Patient is stable.  Denies n/v/f/c.  No new concerns reported by nursing.    7/13/23   Patient at baseline mentation.  Offers no complaints of SOB, cough, chest pain, F/C/N/V/D.   LBP controlled.  Continues with PT for strengthening.  BLE ADRIANE, followed by wound care.  No concerns per nursing    7/14/23    Patient continues to participate in therapy.  Working on bilateral lower extremity exercises.  Completed 2 sets of 15 reps.  Also working on lower extremity exercises.  Completed 3 sets of 5 reps.  In addition patient working on transfer training and bed mobility.  Requires partial to moderate assist for sit to stand.  Patient is stable without complaint.  No new concerns per nursing.    7/18/23    Patient has been working in therapy to improve strength, endurance, and ADLs.  Patient continues to work toward goals.   Patient has no new concerns today.  Denies n/v/f/c pain.  No concerns reported by staff.    7/19/23 Therapy has been working with the patient to improve strength and endurance with ADLs, transfers, and mobility.  Patient continues to work toward goals.  Patient is stable and has no new complaints.  Nursing staff voices no new concerns today.      Objective   Vital signs: 114/64,82,98% O2 via NC     Physical Exam  Constitutional:       General: She is not in acute distress.  Eyes:      Extraocular Movements: Extraocular movements intact.   Pulmonary:      Effort: Pulmonary effort is normal.   Musculoskeletal:      Cervical back: Neck supple.   Neurological:      Mental Status: She is alert.   Psychiatric:         Mood and Affect: Mood normal.         Behavior: Behavior is cooperative.         Assessment/Plan   Problem List Items Addressed This Visit       Primary hypertension     BP at goal monitor         Dementia (CMS/[facility])     Stable no agitation monitor for behaviors         Weakness - Primary     Continue PT OT          Medications, treatments, and labs reviewed  Continue medications and treatments as listed in EMR    Scribe Attestation  Gwen WEISS Scribe   attest that this documentation has been prepared under the direction and in the presence of BROOKS Henderson.     Provider Attestation - Scribe documentation  All medical record entries made by the Scribe were at my direction and personally dictated by me. I have reviewed the chart and agree that the record accurately reflects my personal performance of the history, physical exam, discussion and plan.   BROOKS Henderson

## 2023-09-12 NOTE — PROGRESS NOTES
PROGRESS NOTE    Subjective   Chief complaint: Erinn Eagle is a 83 y.o. female who is an acute skilled patient being seen and evaluated for weakness    HPI:  Patient was admitted to the hospital for back pain. Patient was started on vanco and Zosyn for HTN. Patient's back pain was evaluated by PT and OT. Pt was stable and discharged to a SNF for back pain.     7/6/23 Patient offers no complaints at time. LBP controlled w/ oxycodone.  Afib stable. Denies hot/cold intolerance, diarrhea/constipation. Denies f/c/n/v/d.  HD per schedule.Cont' w/ therapy for weakness, gait and strengthening. No concerns per nursing.    7/13/23   Patient at baseline mentation.  Offers no complaints of SOB, cough, chest pain, F/C/N/V/D.   LBP controlled.  Continues with PT for strengthening.  BLE ADRIANE, followed by wound care.  No concerns per nursing    7/20/23 Patient offers no  complaints at time.  Cont' w/ PT for gait, mobility and strengthening. BLE wounds ADRIANE- followed by wound care.   Offers no complaints of F/C/N/V/D, SOB, chest pain.  No concerns per nursing.    7/25/23 Therapy has been working with the patient to improve strength and endurance with ADLs, transfers, and mobility.  Patient continues to work toward goals.  Patient is stable and has no new complaints.  Nursing staff voices no new concerns today.    7/26/23  Patient continues working with therapy to reach goals. Patient completing multiple therapeutic exercises to increase strength, mobility and flexibility. Skilled interventions focusing on bed mobility d\t pt refusing to get out of bed. Patient continues to require assistance with ADLs. Patient is stable with no new nursing concerns reported.     8/8/23 Patient had reports of cloudy urine. Patient presents for f/u therapy and general medical care.  Patient seen and examined at beside.  Therapy has been working with the patient to improve strength, endurance, ADLs, and transfers d/t generalized  weakness.      Objective   Vital signs: 121/59, 97.1, 18, 71, 95%    Physical Exam  Constitutional:       General: She is not in acute distress.  Eyes:      Extraocular Movements: Extraocular movements intact.   Pulmonary:      Effort: Pulmonary effort is normal.   Musculoskeletal:      Cervical back: Neck supple.   Neurological:      Mental Status: She is alert.   Psychiatric:         Mood and Affect: Mood normal.         Behavior: Behavior is cooperative.         Assessment/Plan   Problem List Items Addressed This Visit       Primary hypertension     BP at goal monitor         Renal failure     Continue dialysis         Dementia (CMS/[facility])     Stable no agitation monitor for behaviors         Weakness - Primary     Continue PT OT          Other Visit Diagnoses       Urine abnormality        cloudy/ milky  urine get uacns          Medications, treatments, and labs reviewed  Continue medications and treatments as listed in [facility]    Scribe Attestation  IOpal Scribe   attest that this documentation has been prepared under the direction and in the presence of BROOKS Henderson.    Provider Attestation - Scribe documentation  All medical record entries made by the Scribe were at my direction and personally dictated by me. I have reviewed the chart and agree that the record accurately reflects my personal performance of the history, physical exam, discussion and plan.    BROOKS Henderson

## 2023-09-27 NOTE — PROGRESS NOTES
PROGRESS NOTE    Subjective   Chief complaint: Erinn Eagle is a 83 y.o. female who is a acute skilled care patient being seen and evaluated for monthly general medical care and follow-up.    HPI:  HPI   Patient presents for general medical care and f/u.  Patient seen and examined at bedside.  Patient has been working in therapy due to weakness after recent hospitalization and back pain.  Patient has no acute complaints.  Denies n/v/f/c and chest pain.  No new concerns reported by nursing.    Objective   Vital signs: 128/68, 99% O2 via nc  Physical Exam  Constitutional:       General: She is not in acute distress.  Eyes:      Extraocular Movements: Extraocular movements intact.   Pulmonary:      Effort: Pulmonary effort is normal.   Genitourinary:     Comments: Snell  Musculoskeletal:      Cervical back: Neck supple.   Neurological:      Mental Status: She is alert.   Psychiatric:         Mood and Affect: Mood normal.         Behavior: Behavior is cooperative.         Assessment/Plan   Problem List Items Addressed This Visit       Paroxysmal atrial fibrillation (CMS/HCC)    Coronary artery disease involving native coronary artery - Primary    CVA (cerebral vascular accident) (CMS/HCC)    Primary hypertension    GERD (gastroesophageal reflux disease)    Dementia (CMS/HCC)     Medications, treatments, and labs reviewed  Continue medications and treatments as listed in EMR    Scribe Attestation  ICrystal Scribe   attest that this documentation has been prepared under the direction and in the presence of Brandan Yan DO    Provider Attestation - Scribe documentation  All medical record entries made by the Scribe were at my direction and personally dictated by me. I have reviewed the chart and agree that the record accurately reflects my personal performance of the history, physical exam, discussion and plan.   Brandan Yan DO

## 2023-09-28 ENCOUNTER — NURSING HOME VISIT (OUTPATIENT)
Dept: POST ACUTE CARE | Facility: EXTERNAL LOCATION | Age: 83
End: 2023-09-28
Payer: COMMERCIAL

## 2023-09-28 DIAGNOSIS — F01.50 VASCULAR DEMENTIA, UNSPECIFIED DEMENTIA SEVERITY, UNSPECIFIED WHETHER BEHAVIORAL, PSYCHOTIC, OR MOOD DISTURBANCE OR ANXIETY (MULTI): ICD-10-CM

## 2023-09-28 DIAGNOSIS — I63.9 CEREBROVASCULAR ACCIDENT (CVA), UNSPECIFIED MECHANISM (MULTI): Primary | ICD-10-CM

## 2023-09-28 PROCEDURE — 99309 SBSQ NF CARE MODERATE MDM 30: CPT | Performed by: INTERNAL MEDICINE

## 2023-09-28 NOTE — PROGRESS NOTES
PROGRESS NOTE    Subjective   Chief complaint: Erinn Eagle is a 83 y.o. female who is an acute skilled patient being seen and evaluated for weakness    HPI:  6/23/23 Patient was admitted to the hospital for back pain. Patient was started on vanco and Zosyn for HTN. Patient's back pain was evaluated by PT and OT. Pt was stable and discharged to a SNF for back pain.     7/6/23 Patient offers no complaints at time. LBP controlled w/ oxycodone.  Afib stable. Denies hot/cold intolerance, diarrhea/constipation. Denies f/c/n/v/d.  HD per schedule.Cont' w/ therapy for weakness, gait and strengthening. No concerns per nursing.    7/7/23   Therapy working with the patient on various BLE therapy exercises to improve strength and functional mobility.  Also worked on skilled interventions focused on weight shifting.  Completed 3 sets of 10 reps.  Patient is stable.  No new concerns reported by nursing.    7/10/23  Patient skilled for therapy due to weakness is working in therapy to increase strength and improve functional mobility.  Requires Min/CGA for bed mobility and Mod A for sit to stand transfers.  Patient is stable.  Denies n/v/f/c.  No new concerns reported by nursing.    7/13/23   Patient at baseline mentation.  Offers no complaints of SOB, cough, chest pain, F/C/N/V/D.   LBP controlled.  Continues with PT for strengthening.  BLE ADRIANE, followed by wound care.  No concerns per nursing    7/14/23    Patient continues to participate in therapy.  Working on bilateral lower extremity exercises.  Completed 2 sets of 15 reps.  Also working on lower extremity exercises.  Completed 3 sets of 5 reps.  In addition patient working on transfer training and bed mobility.  Requires partial to moderate assist for sit to stand.  Patient is stable without complaint.  No new concerns per nursing.    7/18/23    Patient has been working in therapy to improve strength, endurance, and ADLs.  Patient continues to work toward goals.   Patient has no new concerns today.  Denies n/v/f/c pain.  No concerns reported by staff.    7/19/23 Therapy has been working with the patient to improve strength and endurance with ADLs, transfers, and mobility.  Patient continues to work toward goals.  Patient is stable and has no new complaints.  Nursing staff voices no new concerns today.    7/20/23 Patient has no acute complaints.  No new issues per nursing.  Continues working in therapy.        Objective   Vital signs: 96.3, 79, 98%    Physical Exam  Constitutional:       General: She is not in acute distress.  Eyes:      Extraocular Movements: Extraocular movements intact.   Pulmonary:      Effort: Pulmonary effort is normal.   Musculoskeletal:      Cervical back: Neck supple.   Neurological:      Mental Status: She is alert.   Psychiatric:         Mood and Affect: Mood normal.         Behavior: Behavior is cooperative.         Assessment/Plan   Problem List Items Addressed This Visit       Paroxysmal atrial fibrillation (CMS/HCC)    Coronary artery disease involving native coronary artery - Primary    CVA (cerebral vascular accident) (CMS/HCC)    Primary hypertension    GERD (gastroesophageal reflux disease)    Dementia (CMS/HCC)    Weakness     Medications, treatments, and labs reviewed  Continue medications and treatments as listed in [facility]    Scribe Attestation  I, Tessy Mandel   attest that this documentation has been prepared under the direction and in the presence of Brandan Yan DO.    Provider Attestation - Scribe documentation  All medical record entries made by the Scribe were at my direction and personally dictated by me. I have reviewed the chart and agree that the record accurately reflects my personal performance of the history, physical exam, discussion and plan.    Brandan Yan DO

## 2023-09-28 NOTE — PROGRESS NOTES
HISTORY & PHYSICAL    Subjective   Chief complaint: Erinn Eagle is a 83 y.o. female who is a acute skilled care patient being seen and evaluated for multiple medical problems.  Patient presents for weakness.    HPI:  Patient is a 83 year old female with a past medical history of ESRD, sciatica, low back pain, heart failure, orthostatic hypotension, retention of urine. Patient seen and examined today for evaluation of dementia. She was screened for depression. She denies depression. She is jovial and cordial during visit. MoCA administered by attending physician. Patient scored 16 indicating dementia.         Past Medical History:   Diagnosis Date    A-fib (CMS/[facility])     CAD (coronary artery disease)     Chronic back pain     CVA (cerebral vascular accident) (CMS/[facility])     Hypertension, essential        No past surgical history on file.    Family History   Problem Relation Name Age of Onset    No Known Problems Mother      No Known Problems Father         Social History     Socioeconomic History    Marital status: Not on file     Spouse name: Not on file    Number of children: Not on file    Years of education: Not on file    Highest education level: Not on file   Occupational History    Not on file   Tobacco Use    Smoking status: Not on file    Smokeless tobacco: Not on file   Substance and Sexual Activity    Alcohol use: Not on file    Drug use: Not on file    Sexual activity: Not on file   Other Topics Concern    Not on file   Social History Narrative    Not on file     Social Determinants of Health     Financial Resource Strain: Not on file   Food Insecurity: Not on file   Transportation Needs: Not on file   Physical Activity: Not on file   Stress: Not on file   Social Connections: Not on file   Intimate Partner Violence: Not on file   Housing Stability: Not on file       Vital signs: 144/64, 96.7, 63, 20, 94%    Objective   Physical Exam  Constitutional:       Appearance: She is obese.      Comments:  bedbound   HENT:      Head: Normocephalic and atraumatic.      Nose: Nose normal.      Mouth/Throat:      Mouth: Mucous membranes are moist.      Pharynx: Oropharynx is clear.   Eyes:      Extraocular Movements: Extraocular movements intact.      Pupils: Pupils are equal, round, and reactive to light.   Cardiovascular:      Rate and Rhythm: Normal rate and regular rhythm.   Pulmonary:      Effort: No respiratory distress.      Breath sounds: Normal breath sounds. No wheezing, rhonchi or rales.   Abdominal:      General: Bowel sounds are normal. There is no distension.      Palpations: Abdomen is soft.      Tenderness: There is no abdominal tenderness. There is no guarding.   Musculoskeletal:      Right lower leg: No edema.      Left lower leg: No edema.   Skin:     General: Skin is warm and dry.   Neurological:      Mental Status: She is alert. Mental status is at baseline.      Comments: Oriented to name and year   Psychiatric:         Mood and Affect: Mood normal.      Comments: forgetful         Assessment/Plan   Problem List Items Addressed This Visit       CVA (cerebral vascular accident) (CMS/HCC) - Primary    Dementia (CMS/[facility])     Patient lacks the ability to manage her own finances. Her dementia has affected her executive functioning.    Medications, treatments, and labs reviewed  Continue medications and treatments as listed in [facility]    Scribe Attestation  I, Tessy Mandel   attest that this documentation has been prepared under the direction and in the presence of Brandan Yan DO.    Provider Attestation - Scribe documentation  All medical record entries made by the Scribe were at my direction and personally dictated by me. I have reviewed the chart and agree that the record accurately reflects my personal performance of the history, physical exam, discussion and plan.    Brandan Yan DO

## 2023-09-28 NOTE — LETTER
Patient: Erinn Eagle  : 1940    Encounter Date: 2023    HISTORY & PHYSICAL    Subjective  Chief complaint: Erinn Eagle is a 83 y.o. female who is a acute skilled care patient being seen and evaluated for multiple medical problems.  Patient presents for weakness.    HPI:  Patient is a 83 year old female with a past medical history of ESRD, sciatica, low back pain, heart failure, orthostatic hypotension, retention of urine. Patient seen and examined today for evaluation of dementia. She was screened for depression. She denies depression. She is jovial and cordial during visit. MoCA administered by attending physician. Patient scored 16 indicating dementia.         Past Medical History:   Diagnosis Date   • A-fib (CMS/[facility])    • CAD (coronary artery disease)    • Chronic back pain    • CVA (cerebral vascular accident) (CMS/[facility])    • Hypertension, essential        No past surgical history on file.    Family History   Problem Relation Name Age of Onset   • No Known Problems Mother     • No Known Problems Father         Social History     Socioeconomic History   • Marital status: Not on file     Spouse name: Not on file   • Number of children: Not on file   • Years of education: Not on file   • Highest education level: Not on file   Occupational History   • Not on file   Tobacco Use   • Smoking status: Not on file   • Smokeless tobacco: Not on file   Substance and Sexual Activity   • Alcohol use: Not on file   • Drug use: Not on file   • Sexual activity: Not on file   Other Topics Concern   • Not on file   Social History Narrative   • Not on file     Social Determinants of Health     Financial Resource Strain: Not on file   Food Insecurity: Not on file   Transportation Needs: Not on file   Physical Activity: Not on file   Stress: Not on file   Social Connections: Not on file   Intimate Partner Violence: Not on file   Housing Stability: Not on file       Vital signs: 144/64, 96.7, 63, 20,  94%    Objective  Physical Exam  Constitutional:       Appearance: She is obese.      Comments: bedbound   HENT:      Head: Normocephalic and atraumatic.      Nose: Nose normal.      Mouth/Throat:      Mouth: Mucous membranes are moist.      Pharynx: Oropharynx is clear.   Eyes:      Extraocular Movements: Extraocular movements intact.      Pupils: Pupils are equal, round, and reactive to light.   Cardiovascular:      Rate and Rhythm: Normal rate and regular rhythm.   Pulmonary:      Effort: No respiratory distress.      Breath sounds: Normal breath sounds. No wheezing, rhonchi or rales.   Abdominal:      General: Bowel sounds are normal. There is no distension.      Palpations: Abdomen is soft.      Tenderness: There is no abdominal tenderness. There is no guarding.   Musculoskeletal:      Right lower leg: No edema.      Left lower leg: No edema.   Skin:     General: Skin is warm and dry.   Neurological:      Mental Status: She is alert. Mental status is at baseline.      Comments: Oriented to name and year   Psychiatric:         Mood and Affect: Mood normal.      Comments: forgetful         Assessment/Plan  Problem List Items Addressed This Visit       CVA (cerebral vascular accident) (CMS/[facility]) - Primary    Dementia (CMS/[facility])     Patient lacks the ability to manage her own finances. Her dementia has affected her executive functioning.    Medications, treatments, and labs reviewed  Continue medications and treatments as listed in [facility]    Scribe Attestation  I, Tessy Mandel   attest that this documentation has been prepared under the direction and in the presence of Brandan Yan DO.    Provider Attestation - Scribe documentation  All medical record entries made by the Scribe were at my direction and personally dictated by me. I have reviewed the chart and agree that the record accurately reflects my personal performance of the history, physical exam, discussion and plan.    Brandan Yan,  DO          Electronically Signed By: Brandan Yan DO   10/1/23 10:32 PM

## 2023-11-27 ENCOUNTER — NURSING HOME VISIT (OUTPATIENT)
Dept: POST ACUTE CARE | Facility: EXTERNAL LOCATION | Age: 83
End: 2023-11-27
Payer: COMMERCIAL

## 2023-11-27 DIAGNOSIS — R53.1 WEAKNESS: ICD-10-CM

## 2023-11-27 DIAGNOSIS — K21.9 GASTROESOPHAGEAL REFLUX DISEASE WITHOUT ESOPHAGITIS: ICD-10-CM

## 2023-11-27 DIAGNOSIS — I10 PRIMARY HYPERTENSION: ICD-10-CM

## 2023-11-27 DIAGNOSIS — F01.50 VASCULAR DEMENTIA, UNSPECIFIED DEMENTIA SEVERITY, UNSPECIFIED WHETHER BEHAVIORAL, PSYCHOTIC, OR MOOD DISTURBANCE OR ANXIETY (MULTI): Primary | ICD-10-CM

## 2023-11-27 PROCEDURE — 99309 SBSQ NF CARE MODERATE MDM 30: CPT | Performed by: REGISTERED NURSE

## 2023-11-27 NOTE — LETTER
Patient: Erinn Eagle  : 1940    Encounter Date: 2023    PROGRESS NOTE    Subjective  Chief complaint: Erinn Eagle is a 83 y.o. female patient being seen and evaluated for monthly general medical care and follow-up    HPI:  23 Patient presents for general medical care and f/u.  Patient seen and examined at bedside.  No issues per nursing.  Patient has no acute complaints.          Objective  Vital signs: 121/69, 96.8, 72, 98%    Physical Exam  Constitutional:       General: She is not in acute distress.  Eyes:      Extraocular Movements: Extraocular movements intact.   Pulmonary:      Effort: Pulmonary effort is normal.   Musculoskeletal:      Cervical back: Neck supple.   Neurological:      Mental Status: She is alert.   Psychiatric:         Mood and Affect: Mood normal.         Behavior: Behavior is cooperative.         Assessment/Plan  Problem List Items Addressed This Visit       Primary hypertension     BP at goal monitor         GERD (gastroesophageal reflux disease)     Stable monitor         Dementia (CMS/[facility]) - Primary     Stable no agitation monitor for behaviors         Weakness     Continue PT OT          Medications, treatments, and labs reviewed  Continue medications and treatments as listed in EMR    Scribe Attestation  IOpal Scribe   attest that this documentation has been prepared under the direction and in the presence of BROOKS Henderson.    Provider Attestation - Scribe documentation  All medical record entries made by the Scribe were at my direction and personally dictated by me. I have reviewed the chart and agree that the record accurately reflects my personal performance of the history, physical exam, discussion and plan.    BROOKS Henderson      Electronically Signed By: BROOKS Henderson   23 12:13 PM

## 2023-12-04 NOTE — PROGRESS NOTES
PROGRESS NOTE    Subjective   Chief complaint: Erinn Eagle is a 83 y.o. female patient being seen and evaluated for monthly general medical care and follow-up    HPI:  11/27/23 Patient presents for general medical care and f/u.  Patient seen and examined at bedside.  No issues per nursing.  Patient has no acute complaints.          Objective   Vital signs: 121/69, 96.8, 72, 98%    Physical Exam  Constitutional:       General: She is not in acute distress.  Eyes:      Extraocular Movements: Extraocular movements intact.   Pulmonary:      Effort: Pulmonary effort is normal.   Musculoskeletal:      Cervical back: Neck supple.   Neurological:      Mental Status: She is alert.   Psychiatric:         Mood and Affect: Mood normal.         Behavior: Behavior is cooperative.         Assessment/Plan   Problem List Items Addressed This Visit       Primary hypertension     BP at goal monitor         GERD (gastroesophageal reflux disease)     Stable monitor         Dementia (CMS/[facility]) - Primary     Stable no agitation monitor for behaviors         Weakness     Continue PT OT          Medications, treatments, and labs reviewed  Continue medications and treatments as listed in EMR    Scribe Attestation  IOpal Scribe   attest that this documentation has been prepared under the direction and in the presence of BROOKS Henderson.    Provider Attestation - Scribe documentation  All medical record entries made by the Scribe were at my direction and personally dictated by me. I have reviewed the chart and agree that the record accurately reflects my personal performance of the history, physical exam, discussion and plan.    BROOKS Henderson

## 2023-12-18 ENCOUNTER — NURSING HOME VISIT (OUTPATIENT)
Dept: POST ACUTE CARE | Facility: EXTERNAL LOCATION | Age: 83
End: 2023-12-18
Payer: COMMERCIAL

## 2023-12-18 DIAGNOSIS — I10 PRIMARY HYPERTENSION: ICD-10-CM

## 2023-12-18 DIAGNOSIS — K21.9 GASTROESOPHAGEAL REFLUX DISEASE WITHOUT ESOPHAGITIS: Primary | ICD-10-CM

## 2023-12-18 DIAGNOSIS — F01.50 VASCULAR DEMENTIA, UNSPECIFIED DEMENTIA SEVERITY, UNSPECIFIED WHETHER BEHAVIORAL, PSYCHOTIC, OR MOOD DISTURBANCE OR ANXIETY (MULTI): ICD-10-CM

## 2023-12-18 PROCEDURE — 99309 SBSQ NF CARE MODERATE MDM 30: CPT | Performed by: REGISTERED NURSE

## 2023-12-18 NOTE — LETTER
Patient: Erinn Eagle  : 1940    Encounter Date: 2023    PROGRESS NOTE    Subjective  Chief complaint: Erinn Eagle is a 83 y.o. female patient being seen and evaluated for monthly general medical care and follow-up    HPI:  23 Patient presents for general medical care and f/u.  Patient seen and examined at bedside.  No issues per nursing.  Patient has no acute complaints.        Objective  Vital signs: 113/46, 97.5, 63, 96%    Physical Exam  Constitutional:       General: She is not in acute distress.  Eyes:      Extraocular Movements: Extraocular movements intact.   Pulmonary:      Effort: Pulmonary effort is normal.   Musculoskeletal:      Cervical back: Neck supple.   Neurological:      Mental Status: She is alert.   Psychiatric:         Mood and Affect: Mood normal.         Behavior: Behavior is cooperative.         Assessment/Plan  Problem List Items Addressed This Visit       Primary hypertension     BP at goal monitor         GERD (gastroesophageal reflux disease) - Primary     Stable monitor         Dementia (CMS/HCC)     Stable no agitation monitor for behaviors          Medications, treatments, and labs reviewed  Continue medications and treatments as listed in EMR    Scribe Attestation  IOpal Scribe   attest that this documentation has been prepared under the direction and in the presence of BROOKS Henderson.    Provider Attestation - Scribe documentation  All medical record entries made by the Scribe were at my direction and personally dictated by me. I have reviewed the chart and agree that the record accurately reflects my personal performance of the history, physical exam, discussion and plan.    BROOKS Henderson      Electronically Signed By: BROOKS Henderson   23  3:46 PM

## 2023-12-27 NOTE — PROGRESS NOTES
PROGRESS NOTE    Subjective   Chief complaint: Erinn Eagle is a 83 y.o. female patient being seen and evaluated for monthly general medical care and follow-up    HPI:  12/18/23 Patient presents for general medical care and f/u.  Patient seen and examined at bedside.  No issues per nursing.  Patient has no acute complaints.        Objective   Vital signs: 113/46, 97.5, 63, 96%    Physical Exam  Constitutional:       General: She is not in acute distress.  Eyes:      Extraocular Movements: Extraocular movements intact.   Pulmonary:      Effort: Pulmonary effort is normal.   Musculoskeletal:      Cervical back: Neck supple.   Neurological:      Mental Status: She is alert.   Psychiatric:         Mood and Affect: Mood normal.         Behavior: Behavior is cooperative.         Assessment/Plan   Problem List Items Addressed This Visit       Primary hypertension     BP at goal monitor         GERD (gastroesophageal reflux disease) - Primary     Stable monitor         Dementia (CMS/HCC)     Stable no agitation monitor for behaviors          Medications, treatments, and labs reviewed  Continue medications and treatments as listed in EMR    Scribe Attestation  IOpal Scribe   attest that this documentation has been prepared under the direction and in the presence of BROOKS Henderson.    Provider Attestation - Scribe documentation  All medical record entries made by the Scribe were at my direction and personally dictated by me. I have reviewed the chart and agree that the record accurately reflects my personal performance of the history, physical exam, discussion and plan.    BROOKS Henderson

## 2024-02-14 ENCOUNTER — LAB REQUISITION (OUTPATIENT)
Dept: LAB | Facility: HOSPITAL | Age: 84
End: 2024-02-14
Payer: COMMERCIAL

## 2024-02-14 DIAGNOSIS — N18.9 CHRONIC KIDNEY DISEASE, UNSPECIFIED: ICD-10-CM

## 2024-02-14 LAB
ANION GAP SERPL CALC-SCNC: 18 MMOL/L (ref 10–20)
BUN SERPL-MCNC: 93 MG/DL (ref 6–23)
CALCIUM SERPL-MCNC: 8.8 MG/DL (ref 8.6–10.3)
CHLORIDE SERPL-SCNC: 103 MMOL/L (ref 98–107)
CO2 SERPL-SCNC: 18 MMOL/L (ref 21–32)
CREAT SERPL-MCNC: 5.5 MG/DL (ref 0.5–1.05)
EGFRCR SERPLBLD CKD-EPI 2021: 7 ML/MIN/1.73M*2
GLUCOSE SERPL-MCNC: 114 MG/DL (ref 74–99)
POTASSIUM SERPL-SCNC: 5.1 MMOL/L (ref 3.5–5.3)
SODIUM SERPL-SCNC: 134 MMOL/L (ref 136–145)

## 2024-02-14 PROCEDURE — 80048 BASIC METABOLIC PNL TOTAL CA: CPT

## 2024-02-16 ENCOUNTER — LAB REQUISITION (OUTPATIENT)
Dept: LAB | Facility: HOSPITAL | Age: 84
End: 2024-02-16
Payer: COMMERCIAL

## 2024-02-16 ENCOUNTER — NURSING HOME VISIT (OUTPATIENT)
Dept: POST ACUTE CARE | Facility: EXTERNAL LOCATION | Age: 84
End: 2024-02-16
Payer: COMMERCIAL

## 2024-02-16 DIAGNOSIS — N18.6 STAGE 5 CHRONIC KIDNEY DISEASE ON CHRONIC DIALYSIS (MULTI): Primary | ICD-10-CM

## 2024-02-16 DIAGNOSIS — Z99.2 STAGE 5 CHRONIC KIDNEY DISEASE ON CHRONIC DIALYSIS (MULTI): Primary | ICD-10-CM

## 2024-02-16 DIAGNOSIS — N18.9 CHRONIC KIDNEY DISEASE, UNSPECIFIED: ICD-10-CM

## 2024-02-16 DIAGNOSIS — D64.9 ANEMIA, UNSPECIFIED: ICD-10-CM

## 2024-02-16 LAB
ANION GAP SERPL CALC-SCNC: 18 MMOL/L (ref 10–20)
BUN SERPL-MCNC: 100 MG/DL (ref 6–23)
CALCIUM SERPL-MCNC: 8.9 MG/DL (ref 8.6–10.3)
CHLORIDE SERPL-SCNC: 106 MMOL/L (ref 98–107)
CO2 SERPL-SCNC: 17 MMOL/L (ref 21–32)
CREAT SERPL-MCNC: 5.28 MG/DL (ref 0.5–1.05)
EGFRCR SERPLBLD CKD-EPI 2021: 8 ML/MIN/1.73M*2
ERYTHROCYTE [DISTWIDTH] IN BLOOD BY AUTOMATED COUNT: 12.8 % (ref 11.5–14.5)
GLUCOSE SERPL-MCNC: 95 MG/DL (ref 74–99)
HCT VFR BLD AUTO: 33.8 % (ref 36–46)
HGB BLD-MCNC: 11 G/DL (ref 12–16)
MCH RBC QN AUTO: 32.8 PG (ref 26–34)
MCHC RBC AUTO-ENTMCNC: 32.5 G/DL (ref 32–36)
MCV RBC AUTO: 101 FL (ref 80–100)
NRBC BLD-RTO: 0 /100 WBCS (ref 0–0)
PLATELET # BLD AUTO: 259 X10*3/UL (ref 150–450)
POTASSIUM SERPL-SCNC: 5.3 MMOL/L (ref 3.5–5.3)
RBC # BLD AUTO: 3.35 X10*6/UL (ref 4–5.2)
SODIUM SERPL-SCNC: 136 MMOL/L (ref 136–145)
WBC # BLD AUTO: 11.7 X10*3/UL (ref 4.4–11.3)

## 2024-02-16 PROCEDURE — 99308 SBSQ NF CARE LOW MDM 20: CPT | Performed by: REGISTERED NURSE

## 2024-02-16 PROCEDURE — 85027 COMPLETE CBC AUTOMATED: CPT

## 2024-02-16 PROCEDURE — 80048 BASIC METABOLIC PNL TOTAL CA: CPT

## 2024-02-16 NOTE — LETTER
Patient: Erinn Eagle  : 1940    Encounter Date: 2024    PROGRESS NOTE    Subjective  Chief complaint: Erinn Eagle is a 84 y.o. female who is a long term care patient being seen and evaluated for refusing dialysis.     HPI:  HPI nurse report pt refusing dialysis and most medication   Objective  Vital signs: 132/74, 97.6, 87, 20, 96%    Physical Exam  Constitutional:       General: She is not in acute distress.  Eyes:      Extraocular Movements: Extraocular movements intact.   Pulmonary:      Effort: Pulmonary effort is normal.   Musculoskeletal:      Cervical back: Neck supple.   Neurological:      Mental Status: She is alert.   Psychiatric:         Mood and Affect: Mood normal.         Behavior: Behavior is cooperative.         Assessment/Plan  Problem List Items Addressed This Visit       Renal failure - Primary     Bmp   Pt understands she will be sent to hospital for dialysis depending on lab   States will go next dialysis day           Medications, treatments, and labs reviewed  Continue medications and treatments as listed in PCC    Scribe Attestation  IOpal Scribe   attest that this documentation has been prepared under the direction and in the presence of BROOKS Henderson.    Provider Attestation - Scribe documentation  All medical record entries made by the Scribe were at my direction and personally dictated by me. I have reviewed the chart and agree that the record accurately reflects my personal performance of the history, physical exam, discussion and plan.    BROOKS Henderson          Electronically Signed By: BROOKS Henderson   24  5:33 PM

## 2024-02-20 NOTE — PROGRESS NOTES
PROGRESS NOTE    Subjective   Chief complaint: Erinn Eagle is a 84 y.o. female who is a long term care patient being seen and evaluated for refusing dialysis.     HPI:  HPI nurse report pt refusing dialysis and most medication   Objective   Vital signs: 132/74, 97.6, 87, 20, 96%    Physical Exam  Constitutional:       General: She is not in acute distress.  Eyes:      Extraocular Movements: Extraocular movements intact.   Pulmonary:      Effort: Pulmonary effort is normal.   Musculoskeletal:      Cervical back: Neck supple.   Neurological:      Mental Status: She is alert.   Psychiatric:         Mood and Affect: Mood normal.         Behavior: Behavior is cooperative.         Assessment/Plan   Problem List Items Addressed This Visit       Renal failure - Primary     Bmp   Pt understands she will be sent to hospital for dialysis depending on lab   States will go next dialysis day           Medications, treatments, and labs reviewed  Continue medications and treatments as listed in PCC    Scribe Attestation  IOpal Scribe   attest that this documentation has been prepared under the direction and in the presence of BROOKS Henderson.    Provider Attestation - Scribe documentation  All medical record entries made by the Scribe were at my direction and personally dictated by me. I have reviewed the chart and agree that the record accurately reflects my personal performance of the history, physical exam, discussion and plan.    BROOKS Henderson

## 2024-02-28 ENCOUNTER — NURSING HOME VISIT (OUTPATIENT)
Dept: POST ACUTE CARE | Facility: EXTERNAL LOCATION | Age: 84
End: 2024-02-28
Payer: COMMERCIAL

## 2024-02-28 DIAGNOSIS — R53.1 WEAKNESS: ICD-10-CM

## 2024-02-28 DIAGNOSIS — F01.50 VASCULAR DEMENTIA, UNSPECIFIED DEMENTIA SEVERITY, UNSPECIFIED WHETHER BEHAVIORAL, PSYCHOTIC, OR MOOD DISTURBANCE OR ANXIETY (MULTI): Primary | ICD-10-CM

## 2024-02-28 DIAGNOSIS — I10 PRIMARY HYPERTENSION: ICD-10-CM

## 2024-02-28 DIAGNOSIS — K21.9 GASTROESOPHAGEAL REFLUX DISEASE WITHOUT ESOPHAGITIS: ICD-10-CM

## 2024-02-28 PROCEDURE — 99309 SBSQ NF CARE MODERATE MDM 30: CPT | Performed by: REGISTERED NURSE

## 2024-02-28 NOTE — LETTER
Patient: Erinn Eagle  : 1940    Encounter Date: 2024    PROGRESS NOTE    Subjective  Chief complaint: Erinn Eagle is a 84 y.o. female patient being seen and evaluated for monthly general medical care and follow-up    HPI:  24 Patient presents for general medical care and f/u.  Patient seen and examined at bedside.  No issues per nursing.  Patient has no acute complaints.          Objective  Vital signs: 152/85, 96.8, 68, 20, 96%    Physical Exam  Constitutional:       General: She is not in acute distress.  Eyes:      Extraocular Movements: Extraocular movements intact.   Pulmonary:      Effort: Pulmonary effort is normal.   Musculoskeletal:      Cervical back: Neck supple.   Neurological:      Mental Status: She is alert.   Psychiatric:         Mood and Affect: Mood normal.         Behavior: Behavior is cooperative.         Assessment/Plan  Problem List Items Addressed This Visit       Primary hypertension     BP at goal monitor         GERD (gastroesophageal reflux disease)     Stable monitor         Dementia (CMS/[facility]) - Primary     Stable no agitation monitor for behaviors         Weakness     Encourage patient to ask for assistance          Medications, treatments, and labs reviewed  Continue medications and treatments as listed in EMR    Scribe Attestation  I, Tessy Mandel   attest that this documentation has been prepared under the direction and in the presence of BROOKS Henderson.    Provider Attestation - Scribe documentation  All medical record entries made by the Scribe were at my direction and personally dictated by me. I have reviewed the chart and agree that the record accurately reflects my personal performance of the history, physical exam, discussion and plan.    BROOKS Henderson      Electronically Signed By: BROOKS Henderson   3/14/24  3:32 PM

## 2024-02-28 NOTE — ASSESSMENT & PLAN NOTE
Bmp   Pt understands she will be sent to hospital for dialysis depending on lab   States will go next dialysis day

## 2024-02-29 NOTE — PROGRESS NOTES
PROGRESS NOTE    Subjective   Chief complaint: Erinn Eagle is a 84 y.o. female patient being seen and evaluated for monthly general medical care and follow-up    HPI:  2/28/24 Patient presents for general medical care and f/u.  Patient seen and examined at bedside.  No issues per nursing.  Patient has no acute complaints.          Objective   Vital signs: 152/85, 96.8, 68, 20, 96%    Physical Exam  Constitutional:       General: She is not in acute distress.  Eyes:      Extraocular Movements: Extraocular movements intact.   Pulmonary:      Effort: Pulmonary effort is normal.   Musculoskeletal:      Cervical back: Neck supple.   Neurological:      Mental Status: She is alert.   Psychiatric:         Mood and Affect: Mood normal.         Behavior: Behavior is cooperative.         Assessment/Plan   Problem List Items Addressed This Visit       Primary hypertension     BP at goal monitor         GERD (gastroesophageal reflux disease)     Stable monitor         Dementia (CMS/[facility]) - Primary     Stable no agitation monitor for behaviors         Weakness     Encourage patient to ask for assistance          Medications, treatments, and labs reviewed  Continue medications and treatments as listed in EMR    Scribe Attestation  IOpal Scribe   attest that this documentation has been prepared under the direction and in the presence of BROOKS Henderson.    Provider Attestation - Scribe documentation  All medical record entries made by the Scribe were at my direction and personally dictated by me. I have reviewed the chart and agree that the record accurately reflects my personal performance of the history, physical exam, discussion and plan.    BROOKS Henderson

## 2024-03-01 ENCOUNTER — LAB REQUISITION (OUTPATIENT)
Dept: LAB | Facility: HOSPITAL | Age: 84
End: 2024-03-01
Payer: COMMERCIAL

## 2024-03-01 DIAGNOSIS — N18.9 CHRONIC KIDNEY DISEASE, UNSPECIFIED: ICD-10-CM

## 2024-03-01 LAB
ANION GAP SERPL CALC-SCNC: 12 MMOL/L (ref 10–20)
BUN SERPL-MCNC: 76 MG/DL (ref 6–23)
CALCIUM SERPL-MCNC: 8.5 MG/DL (ref 8.6–10.3)
CHLORIDE SERPL-SCNC: 105 MMOL/L (ref 98–107)
CO2 SERPL-SCNC: 22 MMOL/L (ref 21–32)
CREAT SERPL-MCNC: 4.23 MG/DL (ref 0.5–1.05)
EGFRCR SERPLBLD CKD-EPI 2021: 10 ML/MIN/1.73M*2
GLUCOSE SERPL-MCNC: 122 MG/DL (ref 74–99)
POTASSIUM SERPL-SCNC: 4.4 MMOL/L (ref 3.5–5.3)
SODIUM SERPL-SCNC: 135 MMOL/L (ref 136–145)

## 2024-03-01 PROCEDURE — 80048 BASIC METABOLIC PNL TOTAL CA: CPT

## 2024-03-04 ENCOUNTER — LAB REQUISITION (OUTPATIENT)
Dept: LAB | Facility: HOSPITAL | Age: 84
End: 2024-03-04
Payer: COMMERCIAL

## 2024-03-04 ENCOUNTER — NURSING HOME VISIT (OUTPATIENT)
Dept: POST ACUTE CARE | Facility: EXTERNAL LOCATION | Age: 84
End: 2024-03-04
Payer: COMMERCIAL

## 2024-03-04 DIAGNOSIS — N17.9 ACUTE KIDNEY FAILURE, UNSPECIFIED (CMS-HCC): ICD-10-CM

## 2024-03-04 DIAGNOSIS — N18.6 END STAGE RENAL DISEASE (MULTI): Primary | ICD-10-CM

## 2024-03-04 LAB
ANION GAP SERPL CALC-SCNC: 14 MMOL/L (ref 10–20)
BUN SERPL-MCNC: 88 MG/DL (ref 6–23)
CALCIUM SERPL-MCNC: 8.6 MG/DL (ref 8.6–10.3)
CHLORIDE SERPL-SCNC: 105 MMOL/L (ref 98–107)
CO2 SERPL-SCNC: 22 MMOL/L (ref 21–32)
CREAT SERPL-MCNC: 4.08 MG/DL (ref 0.5–1.05)
EGFRCR SERPLBLD CKD-EPI 2021: 10 ML/MIN/1.73M*2
GLUCOSE SERPL-MCNC: 118 MG/DL (ref 74–99)
POTASSIUM SERPL-SCNC: 4.7 MMOL/L (ref 3.5–5.3)
SODIUM SERPL-SCNC: 136 MMOL/L (ref 136–145)

## 2024-03-04 PROCEDURE — 80048 BASIC METABOLIC PNL TOTAL CA: CPT

## 2024-03-04 PROCEDURE — 99308 SBSQ NF CARE LOW MDM 20: CPT | Performed by: REGISTERED NURSE

## 2024-03-04 NOTE — LETTER
Patient: Erinn Eagle  : 1940    Encounter Date: 2024    PROGRESS NOTE    Subjective  Chief complaint: Erinn Eagle is a 84 y.o. female who is a long term care patient being seen and evaluated for refusing dialysis.     HPI:  HPI patient refusing dialysis stat BMP patient does not appear in fluid overload denies shortness of breath  Objective  Vital signs: 134/84, 96.8, 68, 20, 96%    Physical Exam  Constitutional:       General: She is not in acute distress.  Eyes:      Extraocular Movements: Extraocular movements intact.   Pulmonary:      Effort: Pulmonary effort is normal.   Musculoskeletal:      Cervical back: Neck supple.   Neurological:      Mental Status: She is alert.   Psychiatric:         Mood and Affect: Mood normal.         Behavior: Behavior is cooperative.         Assessment/Plan  Problem List Items Addressed This Visit       End stage renal disease (CMS/HCC) - Primary     Refusing dialysis stat BMP          Medications, treatments, and labs reviewed  Continue medications and treatments as listed in PCC    Scribe Attestation  IOpal Scribe   attest that this documentation has been prepared under the direction and in the presence of BROOKS Henderson.    Provider Attestation - Scribe documentation  All medical record entries made by the Scribe were at my direction and personally dictated by me. I have reviewed the chart and agree that the record accurately reflects my personal performance of the history, physical exam, discussion and plan.    BROOKS Henderson          Electronically Signed By: BROOKS Henderson   3/21/24 11:30 AM

## 2024-03-05 NOTE — PROGRESS NOTES
PROGRESS NOTE    Subjective   Chief complaint: Erinn Eagle is a 84 y.o. female who is a long term care patient being seen and evaluated for refusing dialysis.     HPI:  HPI patient refusing dialysis stat BMP patient does not appear in fluid overload denies shortness of breath  Objective   Vital signs: 134/84, 96.8, 68, 20, 96%    Physical Exam  Constitutional:       General: She is not in acute distress.  Eyes:      Extraocular Movements: Extraocular movements intact.   Pulmonary:      Effort: Pulmonary effort is normal.   Musculoskeletal:      Cervical back: Neck supple.   Neurological:      Mental Status: She is alert.   Psychiatric:         Mood and Affect: Mood normal.         Behavior: Behavior is cooperative.         Assessment/Plan   Problem List Items Addressed This Visit       End stage renal disease (CMS/HCC) - Primary     Refusing dialysis stat BMP          Medications, treatments, and labs reviewed  Continue medications and treatments as listed in PCC    Scribe Attestation  Opal WEISS Scribe   attest that this documentation has been prepared under the direction and in the presence of BROOKS Henderson.    Provider Attestation - Scribe documentation  All medical record entries made by the Scribe were at my direction and personally dictated by me. I have reviewed the chart and agree that the record accurately reflects my personal performance of the history, physical exam, discussion and plan.    BROOKS Henderson

## 2024-03-06 ENCOUNTER — NURSING HOME VISIT (OUTPATIENT)
Dept: POST ACUTE CARE | Facility: EXTERNAL LOCATION | Age: 84
End: 2024-03-06
Payer: COMMERCIAL

## 2024-03-06 ENCOUNTER — LAB REQUISITION (OUTPATIENT)
Dept: LAB | Facility: HOSPITAL | Age: 84
End: 2024-03-06
Payer: COMMERCIAL

## 2024-03-06 DIAGNOSIS — N18.6 END STAGE RENAL DISEASE (MULTI): Primary | ICD-10-CM

## 2024-03-06 DIAGNOSIS — N18.9 CHRONIC KIDNEY DISEASE, UNSPECIFIED: ICD-10-CM

## 2024-03-06 LAB
ANION GAP SERPL CALC-SCNC: 14 MMOL/L (ref 10–20)
BUN SERPL-MCNC: 77 MG/DL (ref 6–23)
CALCIUM SERPL-MCNC: 8.8 MG/DL (ref 8.6–10.3)
CHLORIDE SERPL-SCNC: 108 MMOL/L (ref 98–107)
CO2 SERPL-SCNC: 19 MMOL/L (ref 21–32)
CREAT SERPL-MCNC: 4.09 MG/DL (ref 0.5–1.05)
EGFRCR SERPLBLD CKD-EPI 2021: 10 ML/MIN/1.73M*2
GLUCOSE SERPL-MCNC: 156 MG/DL (ref 74–99)
POTASSIUM SERPL-SCNC: 4.8 MMOL/L (ref 3.5–5.3)
SODIUM SERPL-SCNC: 136 MMOL/L (ref 136–145)

## 2024-03-06 PROCEDURE — 80048 BASIC METABOLIC PNL TOTAL CA: CPT

## 2024-03-06 PROCEDURE — 99308 SBSQ NF CARE LOW MDM 20: CPT | Performed by: REGISTERED NURSE

## 2024-03-06 NOTE — LETTER
Patient: Erinn Eagle  : 1940    Encounter Date: 2024    PROGRESS NOTE    Subjective  Chief complaint: Erinn Eagle is a 84 y.o. female who is a long term care patient being seen and evaluated for follow up.     HPI:  HPI patient continues to refuse dialysis has not had dialysis 1 week psych consult labs.  Patient denies shortness of breath no obvious signs of increased fluid retention Objective  Vital signs: 122/81, 96.8, 68, 20, 96%    Physical Exam  Constitutional:       General: She is not in acute distress.  Eyes:      Extraocular Movements: Extraocular movements intact.   Pulmonary:      Effort: Pulmonary effort is normal.   Musculoskeletal:      Cervical back: Neck supple.   Neurological:      Mental Status: She is alert.   Psychiatric:         Mood and Affect: Mood normal.         Behavior: Behavior is cooperative.         Assessment/Plan  Problem List Items Addressed This Visit       End stage renal disease (CMS/HCC) - Primary     Continues to refuse dialysis psych consult          Medications, treatments, and labs reviewed  Continue medications and treatments as listed in PCC    Scribe Attestation  IOpal Scribe   attest that this documentation has been prepared under the direction and in the presence of BROOKS Henderson.    Provider Attestation - Scribe documentation  All medical record entries made by the Scribe were at my direction and personally dictated by me. I have reviewed the chart and agree that the record accurately reflects my personal performance of the history, physical exam, discussion and plan.    BROOKS Henderson          Electronically Signed By: BROOKS Henderson   3/21/24  2:39 PM

## 2024-03-07 NOTE — PROGRESS NOTES
PROGRESS NOTE    Subjective   Chief complaint: Erinn Eagle is a 84 y.o. female who is a long term care patient being seen and evaluated for follow up.     HPI:  HPI patient continues to refuse dialysis has not had dialysis 1 week psych consult labs.  Patient denies shortness of breath no obvious signs of increased fluid retention Objective   Vital signs: 122/81, 96.8, 68, 20, 96%    Physical Exam  Constitutional:       General: She is not in acute distress.  Eyes:      Extraocular Movements: Extraocular movements intact.   Pulmonary:      Effort: Pulmonary effort is normal.   Musculoskeletal:      Cervical back: Neck supple.   Neurological:      Mental Status: She is alert.   Psychiatric:         Mood and Affect: Mood normal.         Behavior: Behavior is cooperative.         Assessment/Plan   Problem List Items Addressed This Visit       End stage renal disease (CMS/HCC) - Primary     Continues to refuse dialysis psych consult          Medications, treatments, and labs reviewed  Continue medications and treatments as listed in PCC    Scribe Attestation  Opla WEISS Scribe   attest that this documentation has been prepared under the direction and in the presence of BROOKS Henderson.    Provider Attestation - Scribe documentation  All medical record entries made by the Scribe were at my direction and personally dictated by me. I have reviewed the chart and agree that the record accurately reflects my personal performance of the history, physical exam, discussion and plan.    BROOKS Henderson

## 2024-03-13 ENCOUNTER — NURSING HOME VISIT (OUTPATIENT)
Dept: POST ACUTE CARE | Facility: EXTERNAL LOCATION | Age: 84
End: 2024-03-13
Payer: COMMERCIAL

## 2024-03-13 DIAGNOSIS — F01.50 VASCULAR DEMENTIA, UNSPECIFIED DEMENTIA SEVERITY, UNSPECIFIED WHETHER BEHAVIORAL, PSYCHOTIC, OR MOOD DISTURBANCE OR ANXIETY (MULTI): Primary | ICD-10-CM

## 2024-03-13 PROCEDURE — 99308 SBSQ NF CARE LOW MDM 20: CPT | Performed by: REGISTERED NURSE

## 2024-03-13 NOTE — LETTER
Patient: Erinn Eagle  : 1940    Encounter Date: 2024    PROGRESS NOTE    Subjective  Chief complaint: Erinn Eagle is a 84 y.o. female who is a long term care patient being seen and evaluated for follow up.     HPI:  HPI pt has signed with hospice service and no longer receiving dialysis   Objective  Vital signs: 134/74, 96.8, 68, 20, 96%    Physical Exam  Constitutional:       General: She is not in acute distress.  Eyes:      Extraocular Movements: Extraocular movements intact.   Pulmonary:      Effort: Pulmonary effort is normal.   Musculoskeletal:      Cervical back: Neck supple.   Neurological:      Mental Status: She is alert.   Psychiatric:         Mood and Affect: Mood normal.         Behavior: Behavior is cooperative.         Assessment/Plan  Problem List Items Addressed This Visit       Dementia (CMS/[facility]) - Primary     Signed with hospice   Ekit in place           Medications, treatments, and labs reviewed  Continue medications and treatments as listed in PCC    Scribe Attestation  IOpal Scribe   attest that this documentation has been prepared under the direction and in the presence of BROOKS Henderson.    Provider Attestation - Scribe documentation  All medical record entries made by the Scribe were at my direction and personally dictated by me. I have reviewed the chart and agree that the record accurately reflects my personal performance of the history, physical exam, discussion and plan.    BROOKS Henderson          Electronically Signed By: BROOKS Henderson   3/27/24  7:46 PM

## 2024-03-14 NOTE — PROGRESS NOTES
PROGRESS NOTE    Subjective   Chief complaint: Erinn Eagle is a 84 y.o. female who is a long term care patient being seen and evaluated for follow up.     HPI:  HPI pt has signed with hospice service and no longer receiving dialysis   Objective   Vital signs: 134/74, 96.8, 68, 20, 96%    Physical Exam  Constitutional:       General: She is not in acute distress.  Eyes:      Extraocular Movements: Extraocular movements intact.   Pulmonary:      Effort: Pulmonary effort is normal.   Musculoskeletal:      Cervical back: Neck supple.   Neurological:      Mental Status: She is alert.   Psychiatric:         Mood and Affect: Mood normal.         Behavior: Behavior is cooperative.         Assessment/Plan   Problem List Items Addressed This Visit       Dementia (CMS/[facility]) - Primary     Signed with hospice   Ekit in place           Medications, treatments, and labs reviewed  Continue medications and treatments as listed in PCC    Scribe Attestation  IOpal Scribe   attest that this documentation has been prepared under the direction and in the presence of BROOKS Henderson.    Provider Attestation - Scribe documentation  All medical record entries made by the Scribe were at my direction and personally dictated by me. I have reviewed the chart and agree that the record accurately reflects my personal performance of the history, physical exam, discussion and plan.    BROOKS Henderson

## 2024-03-21 PROBLEM — N18.6 END STAGE RENAL DISEASE (MULTI): Status: ACTIVE | Noted: 2024-03-21

## 2024-03-27 ENCOUNTER — NURSING HOME VISIT (OUTPATIENT)
Dept: POST ACUTE CARE | Facility: EXTERNAL LOCATION | Age: 84
End: 2024-03-27
Payer: COMMERCIAL

## 2024-03-27 DIAGNOSIS — F01.50 VASCULAR DEMENTIA, UNSPECIFIED DEMENTIA SEVERITY, UNSPECIFIED WHETHER BEHAVIORAL, PSYCHOTIC, OR MOOD DISTURBANCE OR ANXIETY (MULTI): Primary | ICD-10-CM

## 2024-03-27 DIAGNOSIS — N18.6 END STAGE RENAL DISEASE (MULTI): ICD-10-CM

## 2024-03-27 DIAGNOSIS — I10 PRIMARY HYPERTENSION: ICD-10-CM

## 2024-03-27 PROCEDURE — 99309 SBSQ NF CARE MODERATE MDM 30: CPT | Performed by: REGISTERED NURSE

## 2024-03-27 NOTE — LETTER
Patient: Erinn Eagle  : 1940    Encounter Date: 2024    PROGRESS NOTE    Subjective  Chief complaint: Erinn Eagle is a 84 y.o. female patient being seen and evaluated for monthly general medical care and follow-up    HPI:  3/27/24 Patient presents for general medical care and f/u.  Patient seen and examined at bedside.  No issues per nursing.  Patient has no acute complaints.      Remains on hospice service     Objective  Vital signs: 130/66, 96.8, 68, 20, 96%    Physical Exam  Constitutional:       General: She is not in acute distress.  Eyes:      Extraocular Movements: Extraocular movements intact.   Pulmonary:      Effort: Pulmonary effort is normal.   Musculoskeletal:      Cervical back: Neck supple.   Neurological:      Mental Status: She is alert.   Psychiatric:         Mood and Affect: Mood normal.         Behavior: Behavior is cooperative.       Assessment/Plan  Problem List Items Addressed This Visit       Primary hypertension     BP at goal monitor         Dementia (Multi) - Primary     Signed with hospice   Ekit in place          End stage renal disease (Multi)     On hospice no longer receiving dialysis           Medications, treatments, and labs reviewed  Continue medications and treatments as listed in EMR    Scribe Attestation  I, Tessy Mandel   attest that this documentation has been prepared under the direction and in the presence of BROOKS Henderson.    Provider Attestation - Scribe documentation  All medical record entries made by the Scribe were at my direction and personally dictated by me. I have reviewed the chart and agree that the record accurately reflects my personal performance of the history, physical exam, discussion and plan.    BROOKS Henderson      Electronically Signed By: BROOKS Henderson   24  4:18 PM

## 2024-03-28 NOTE — PROGRESS NOTES
PROGRESS NOTE    Subjective   Chief complaint: Erinn Eagle is a 84 y.o. female patient being seen and evaluated for monthly general medical care and follow-up    HPI:  3/27/24 Patient presents for general medical care and f/u.  Patient seen and examined at bedside.  No issues per nursing.  Patient has no acute complaints.      Remains on hospice service     Objective   Vital signs: 130/66, 96.8, 68, 20, 96%    Physical Exam  Constitutional:       General: She is not in acute distress.  Eyes:      Extraocular Movements: Extraocular movements intact.   Pulmonary:      Effort: Pulmonary effort is normal.   Musculoskeletal:      Cervical back: Neck supple.   Neurological:      Mental Status: She is alert.   Psychiatric:         Mood and Affect: Mood normal.         Behavior: Behavior is cooperative.       Assessment/Plan   Problem List Items Addressed This Visit       Primary hypertension     BP at goal monitor         Dementia (Multi) - Primary     Signed with hospice   Ekit in place          End stage renal disease (Multi)     On hospice no longer receiving dialysis           Medications, treatments, and labs reviewed  Continue medications and treatments as listed in EMR    Scribe Attestation  IOpal Scribe   attest that this documentation has been prepared under the direction and in the presence of BROOKS Henderson.    Provider Attestation - Scribe documentation  All medical record entries made by the Scribe were at my direction and personally dictated by me. I have reviewed the chart and agree that the record accurately reflects my personal performance of the history, physical exam, discussion and plan.    BROOKS Henderson